# Patient Record
Sex: MALE | Race: WHITE | NOT HISPANIC OR LATINO | Employment: OTHER | URBAN - METROPOLITAN AREA
[De-identification: names, ages, dates, MRNs, and addresses within clinical notes are randomized per-mention and may not be internally consistent; named-entity substitution may affect disease eponyms.]

---

## 2020-06-15 ENCOUNTER — TRANSCRIBE ORDERS (OUTPATIENT)
Dept: PHYSICAL THERAPY | Facility: MEDICAL CENTER | Age: 80
End: 2020-06-15

## 2020-06-15 ENCOUNTER — EVALUATION (OUTPATIENT)
Dept: PHYSICAL THERAPY | Facility: MEDICAL CENTER | Age: 80
End: 2020-06-15
Payer: MEDICARE

## 2020-06-15 DIAGNOSIS — M17.11 PRIMARY OSTEOARTHRITIS OF RIGHT KNEE: Primary | ICD-10-CM

## 2020-06-15 PROCEDURE — 97110 THERAPEUTIC EXERCISES: CPT | Performed by: PHYSICAL THERAPIST

## 2020-06-15 PROCEDURE — 97161 PT EVAL LOW COMPLEX 20 MIN: CPT | Performed by: PHYSICAL THERAPIST

## 2020-06-19 ENCOUNTER — OFFICE VISIT (OUTPATIENT)
Dept: PHYSICAL THERAPY | Facility: MEDICAL CENTER | Age: 80
End: 2020-06-19
Payer: MEDICARE

## 2020-06-19 DIAGNOSIS — M17.11 PRIMARY OSTEOARTHRITIS OF RIGHT KNEE: Primary | ICD-10-CM

## 2020-06-19 PROCEDURE — 97110 THERAPEUTIC EXERCISES: CPT | Performed by: PHYSICAL THERAPIST

## 2020-06-19 PROCEDURE — 97140 MANUAL THERAPY 1/> REGIONS: CPT | Performed by: PHYSICAL THERAPIST

## 2020-06-22 ENCOUNTER — OFFICE VISIT (OUTPATIENT)
Dept: PHYSICAL THERAPY | Facility: MEDICAL CENTER | Age: 80
End: 2020-06-22
Payer: MEDICARE

## 2020-06-22 DIAGNOSIS — M17.11 PRIMARY OSTEOARTHRITIS OF RIGHT KNEE: Primary | ICD-10-CM

## 2020-06-22 PROCEDURE — 97140 MANUAL THERAPY 1/> REGIONS: CPT | Performed by: PHYSICAL THERAPIST

## 2020-06-22 PROCEDURE — 97110 THERAPEUTIC EXERCISES: CPT | Performed by: PHYSICAL THERAPIST

## 2020-06-24 ENCOUNTER — OFFICE VISIT (OUTPATIENT)
Dept: PHYSICAL THERAPY | Facility: MEDICAL CENTER | Age: 80
End: 2020-06-24
Payer: MEDICARE

## 2020-06-24 DIAGNOSIS — M17.11 PRIMARY OSTEOARTHRITIS OF RIGHT KNEE: Primary | ICD-10-CM

## 2020-06-24 PROCEDURE — 97110 THERAPEUTIC EXERCISES: CPT | Performed by: PHYSICAL THERAPIST

## 2020-06-24 PROCEDURE — 97140 MANUAL THERAPY 1/> REGIONS: CPT | Performed by: PHYSICAL THERAPIST

## 2020-07-01 ENCOUNTER — OFFICE VISIT (OUTPATIENT)
Dept: PHYSICAL THERAPY | Facility: MEDICAL CENTER | Age: 80
End: 2020-07-01
Payer: MEDICARE

## 2020-07-01 DIAGNOSIS — M17.11 PRIMARY OSTEOARTHRITIS OF RIGHT KNEE: Primary | ICD-10-CM

## 2020-07-01 PROCEDURE — 97110 THERAPEUTIC EXERCISES: CPT | Performed by: PHYSICAL THERAPIST

## 2020-07-01 PROCEDURE — 97140 MANUAL THERAPY 1/> REGIONS: CPT | Performed by: PHYSICAL THERAPIST

## 2020-07-01 NOTE — PROGRESS NOTES
Daily Note     Today's date: 2020  Patient name: Sean Hall  : 1940  MRN: [id number]  Referring provider: Nader Lin DO  Dx:   Encounter Diagnosis     ICD-10-CM    1  Primary osteoarthritis of right knee M17 11                   Subjective: Mario reports his knee is feeling better, he has been having some pain in the outside of his knee      Objective: See treatment diary below      Assessment: Tolerated treatment well  Patient demonstrated fatigue post treatment and would benefit from continued PT      Plan: Progress treatment as tolerated         Precautions: CHF, ABBEY AIN [facility]      Manuals 6/15 6/19 6/22 6/24 7/1        laser  JONE BECERRIL JE        Tib-fem distraction JONE BECERRIL JE JE JE                                  Neuro Re-Ed                                                                                                        Ther Ex             HS stretch 30"x2 30"x3 30"x3 30"x3 30"x3        gastroc stretch 30"x2 30"x2 30"x2 30"x2 30"x3        Heel slides  15 20 20 30        SLR  20 20 20 30        Stand HS curl  20 20 20 30        Stand hip abd  20 20 20 30        Stand hip ext    20 30                     Ther Activity                                       Gait Training                                       Modalities

## 2020-07-03 ENCOUNTER — OFFICE VISIT (OUTPATIENT)
Dept: PHYSICAL THERAPY | Facility: MEDICAL CENTER | Age: 80
End: 2020-07-03
Payer: MEDICARE

## 2020-07-03 DIAGNOSIS — M17.11 PRIMARY OSTEOARTHRITIS OF RIGHT KNEE: Primary | ICD-10-CM

## 2020-07-03 PROCEDURE — 97110 THERAPEUTIC EXERCISES: CPT | Performed by: PHYSICAL THERAPIST

## 2020-07-03 PROCEDURE — 97140 MANUAL THERAPY 1/> REGIONS: CPT | Performed by: PHYSICAL THERAPIST

## 2020-07-03 NOTE — PROGRESS NOTES
Daily Note     Today's date: 7/3/2020  Patient name: Orion Marks  : 1940  MRN: [id number]  Referring provider: Marcel Stokes DO  Dx:   Encounter Diagnosis     ICD-10-CM    1  Primary osteoarthritis of right knee M17 11                   Subjective: Mario reports his knee has been feeling better      Objective: See treatment diary below      Assessment: Tolerated treatment well  Patient exhibited good technique with therapeutic exercises      Plan: Progress treatment as tolerated         Precautions: CHF, ABBEY AIN [facility]      Manuals 6/15 6/19 6/22 6/24 7/1 7/3       laser  JONE BECERRIL JE JE JE       Tib-fem distraction JONE BECERRIL JE JE JE                                 Neuro Re-Ed                                                                                                        Ther Ex             HS stretch 30"x2 30"x3 30"x3 30"x3 30"x3 30"x3       gastroc stretch 30"x2 30"x2 30"x2 30"x2 30"x3 30"x3       Heel slides  15 20 20 30 30       SLR  20 20 20 30        Stand HS curl  20 20 20 30        Stand hip abd  20 20 20 30 30       Stand hip ext    20 30                     Ther Activity                                       Gait Training                                       Modalities

## 2020-07-08 ENCOUNTER — OFFICE VISIT (OUTPATIENT)
Dept: PHYSICAL THERAPY | Facility: MEDICAL CENTER | Age: 80
End: 2020-07-08
Payer: MEDICARE

## 2020-07-08 DIAGNOSIS — M17.11 PRIMARY OSTEOARTHRITIS OF RIGHT KNEE: Primary | ICD-10-CM

## 2020-07-08 PROCEDURE — 97110 THERAPEUTIC EXERCISES: CPT | Performed by: PHYSICAL THERAPIST

## 2020-07-08 PROCEDURE — 97140 MANUAL THERAPY 1/> REGIONS: CPT | Performed by: PHYSICAL THERAPIST

## 2020-07-08 NOTE — PROGRESS NOTES
Daily Note     Today's date: 2020  Patient name: Moise Peraza  : 1940  MRN: [id number]  Referring provider: Kia Rodriguez DO  Dx:   Encounter Diagnosis     ICD-10-CM    1  Primary osteoarthritis of right knee M17 11                   Subjective: Mario reports his knee continues to feel a lot better  He sees Dr Margarita Flor tomorrow  Objective: See treatment diary below      Assessment: Tolerated treatment well  Patient exhibited good technique with therapeutic exercises      Plan: Potential discharge next visit       Precautions: CHF, ABBEY TWAIN [facility]      Manuals 6/15 6/19 6/22 6/24 7/1 7/3 7/8      laser  JONE BECERRIL JE JE JE JE      Tib-fem distraction JE JONE JE JE JE JE JE                                Neuro Re-Ed                                                                                                        Ther Ex             HS stretch 30"x2 30"x3 30"x3 30"x3 30"x3 30"x3 30"x3      gastroc stretch 30"x2 30"x2 30"x2 30"x2 30"x3 30"x3 30"x3      Heel slides  15 20 20 30 30 30      SLR  20 20 20 30        Stand HS curl  20 20 20 30        Stand hip abd  20 20 20 30 30 30      Stand hip ext    20 30                     Ther Activity                                       Gait Training                                       Modalities

## 2020-07-10 ENCOUNTER — OFFICE VISIT (OUTPATIENT)
Dept: PHYSICAL THERAPY | Facility: MEDICAL CENTER | Age: 80
End: 2020-07-10
Payer: MEDICARE

## 2020-07-10 DIAGNOSIS — M17.11 PRIMARY OSTEOARTHRITIS OF RIGHT KNEE: Primary | ICD-10-CM

## 2020-07-10 PROCEDURE — 97140 MANUAL THERAPY 1/> REGIONS: CPT | Performed by: PHYSICAL THERAPIST

## 2020-07-10 PROCEDURE — 97110 THERAPEUTIC EXERCISES: CPT | Performed by: PHYSICAL THERAPIST

## 2020-07-10 NOTE — PROGRESS NOTES
Daily Note     Today's date: 7/10/2020  Patient name: Winnie Sagastume  : 1940  MRN: [id number]  Referring provider: Fabian Hampton DO  Dx:   Encounter Diagnosis     ICD-10-CM    1  Primary osteoarthritis of right knee M17 11                   Subjective: Mario reports Dr Thelma Altman pleased with progress thus far      Objective: See treatment diary below      Assessment: Tolerated treatment well  Patient would benefit from continued PT      Plan: Progress treatment as tolerated         Precautions: CHF, ABBEY TWAIN [facility]      Manuals 6/15 6/19 6/22 6/24 7/1 7/3 7/8 7/10     laser  JE JE JE JE JE JE JE     Tib-fem distraction JE JE JE JE JE JE JE JE                               Neuro Re-Ed                                                                                                        Ther Ex             HS stretch 30"x2 30"x3 30"x3 30"x3 30"x3 30"x3 30"x3 30"x3     gastroc stretch 30"x2 30"x2 30"x2 30"x2 30"x3 30"x3 30"x3 30"x3     Heel slides  15 20 20 30 30 30 30     SLR  20 20 20 30        Stand HS curl  20 20 20 30        Stand hip abd  20 20 20 30 30 30 30     Stand hip ext    20 30                     Ther Activity                                       Gait Training                                       Modalities

## 2020-07-15 ENCOUNTER — OFFICE VISIT (OUTPATIENT)
Dept: PHYSICAL THERAPY | Facility: MEDICAL CENTER | Age: 80
End: 2020-07-15
Payer: MEDICARE

## 2020-07-15 DIAGNOSIS — M17.11 PRIMARY OSTEOARTHRITIS OF RIGHT KNEE: Primary | ICD-10-CM

## 2020-07-15 PROCEDURE — 97140 MANUAL THERAPY 1/> REGIONS: CPT | Performed by: PHYSICAL THERAPIST

## 2020-07-15 PROCEDURE — 97110 THERAPEUTIC EXERCISES: CPT | Performed by: PHYSICAL THERAPIST

## 2020-07-15 NOTE — PROGRESS NOTES
Daily Note     Today's date: 7/15/2020  Patient name: King Braydon  : 1940  MRN: [id number]  Referring provider: Quynh Alford DO  Dx:   Encounter Diagnosis     ICD-10-CM    1  Primary osteoarthritis of right knee M17 11                   Subjective: Mario reports his knee has been feeling good      Objective: See treatment diary below      Assessment: Tolerated treatment well  Patient exhibited good technique with therapeutic exercises      Plan: Potential discharge next visit       Precautions: CHF, ABBEY TWAIN [facility]      Manuals 6/15 6/19 6/22 6/24 7/1 7/3 7/8 7/10 7/15    laser  JE JE JE JE JE JE JE JE    Tib-fem distraction JE JE JE JE JE JE JE JE JE                              Neuro Re-Ed                                                                                                        Ther Ex             HS stretch 30"x2 30"x3 30"x3 30"x3 30"x3 30"x3 30"x3 30"x3 3    gastroc stretch 30"x2 30"x2 30"x2 30"x2 30"x3 30"x3 30"x3 30"x3 3    Heel slides  15 20 20 30 30 30 30 30    SLR  20 20 20 30        Stand HS curl  20 20 20 30        Stand hip abd  20 20 20 30 30 30 30 30    Stand hip ext    20 30                     Ther Activity                                       Gait Training                                       Modalities

## 2020-07-17 ENCOUNTER — OFFICE VISIT (OUTPATIENT)
Dept: PHYSICAL THERAPY | Facility: MEDICAL CENTER | Age: 80
End: 2020-07-17
Payer: MEDICARE

## 2020-07-17 DIAGNOSIS — M17.11 PRIMARY OSTEOARTHRITIS OF RIGHT KNEE: Primary | ICD-10-CM

## 2020-07-17 PROCEDURE — 97140 MANUAL THERAPY 1/> REGIONS: CPT | Performed by: PHYSICAL THERAPIST

## 2020-07-17 PROCEDURE — 97110 THERAPEUTIC EXERCISES: CPT | Performed by: PHYSICAL THERAPIST

## 2020-07-17 NOTE — PROGRESS NOTES
Daily Note     Today's date: 2020  Patient name: Winnie Sagastume  : 1940  MRN: [id number]  Referring provider: Fabian Hampton DO  Dx:   Encounter Diagnosis     ICD-10-CM    1  Primary osteoarthritis of right knee M17 11                   Subjective: Mario reports his knee feels stiff and achy today      Objective: See treatment diary below      Assessment: Tolerated treatment well  Patient would benefit from continued PT      Plan: Progress treatment as tolerated         Precautions: CHF, ABBEY TWAIN [facility]      Manuals 6/15 6/19 6/22 6/24 7/1 7/3 7/8 7/10 7/15 7/17   laser  JE JE JE JE JE JE JE JE JE   Tib-fem distraction JE JE JE JE JE JE JE JE JE JE                             Neuro Re-Ed                                                                                                        Ther Ex             HS stretch 30"x2 30"x3 30"x3 30"x3 30"x3 30"x3 30"x3 30"x3 3 3   gastroc stretch 30"x2 30"x2 30"x2 30"x2 30"x3 30"x3 30"x3 30"x3 3 3   Heel slides  15 20 20 30 30 30 30 30 30   SLR  20 20 20 30        Stand HS curl  20 20 20 30        Stand hip abd  20 20 20 30 30 30 30 30 30   Stand hip ext    20 30                     Ther Activity                                       Gait Training                                       Modalities

## 2020-07-22 ENCOUNTER — OFFICE VISIT (OUTPATIENT)
Dept: PHYSICAL THERAPY | Facility: MEDICAL CENTER | Age: 80
End: 2020-07-22
Payer: MEDICARE

## 2020-07-22 DIAGNOSIS — M17.11 PRIMARY OSTEOARTHRITIS OF RIGHT KNEE: Primary | ICD-10-CM

## 2020-07-22 PROCEDURE — 97140 MANUAL THERAPY 1/> REGIONS: CPT | Performed by: PHYSICAL THERAPIST

## 2020-07-22 PROCEDURE — 97110 THERAPEUTIC EXERCISES: CPT | Performed by: PHYSICAL THERAPIST

## 2020-07-22 NOTE — PROGRESS NOTES
Daily Note     Today's date: 2020  Patient name: Zachariah Damian  : 1940  MRN: [id number]  Referring provider: Raúl Dillon DO  Dx:   Encounter Diagnosis     ICD-10-CM    1  Primary osteoarthritis of right knee M17 11                   Subjective: Mario reports his knee feels achy      Objective: See treatment diary below      Assessment: Tolerated treatment well  Patient exhibited good technique with therapeutic exercises      Plan: Potential discharge next visit       Precautions: CHF, ABBEY TWAIN [facility]      Manuals 7/22 6/19 6/22 6/24 7/1 7/3 7/8 7/10 7/15 7/17   laser JE JE JE JE JE JE JE JE JE JE   Tib-fem distraction JE JE JE JE JE JE JE JE JE JE                             Neuro Re-Ed                                                                                                        Ther Ex             HS stretch 30"x3 30"x3 30"x3 30"x3 30"x3 30"x3 30"x3 30"x3 3 3   gastroc stretch 30"x3 30"x2 30"x2 30"x2 30"x3 30"x3 30"x3 30"x3 3 3   Heel slides  15 20 20 30 30 30 30 30 30   SLR  20 20 20 30        Stand HS curl  20 20 20 30        Stand hip abd 30 20 20 20 30 30 30 30 30 30   Stand hip ext    20 30                     Ther Activity                                       Gait Training                                       Modalities

## 2020-07-24 ENCOUNTER — OFFICE VISIT (OUTPATIENT)
Dept: PHYSICAL THERAPY | Facility: MEDICAL CENTER | Age: 80
End: 2020-07-24
Payer: MEDICARE

## 2020-07-24 DIAGNOSIS — M17.11 PRIMARY OSTEOARTHRITIS OF RIGHT KNEE: Primary | ICD-10-CM

## 2020-07-24 PROCEDURE — 97140 MANUAL THERAPY 1/> REGIONS: CPT | Performed by: PHYSICAL THERAPIST

## 2020-07-24 PROCEDURE — 97110 THERAPEUTIC EXERCISES: CPT | Performed by: PHYSICAL THERAPIST

## 2020-07-24 NOTE — PROGRESS NOTES
Daily Note     Today's date: 2020  Patient name: Brenda Joy  : 1940  MRN: [id number]  Referring provider: Shilpa Torres DO  Dx:   Encounter Diagnosis     ICD-10-CM    1  Primary osteoarthritis of right knee M17 11                   Subjective: Mario reports his knee is achy  He still does not have the  brace that was ordered      Objective: See treatment diary below      Assessment: Tolerated treatment well  Patient exhibited good technique with therapeutic exercises      Plan: Progress treatment as tolerated         Precautions: CHF, ABBEY TWAIN [facility]      Manuals 7/22 7/24 6/22 6/24 7/1 7/3 7/8 7/10 7/15 7/17   laser JE JE JE JE JE JE JE JE JE JE   Tib-fem distraction JE JE JE JE JE JE JE JE JE JE                             Neuro Re-Ed                                                                                                        Ther Ex             HS stretch 30"x3 30"x3 30"x3 30"x3 30"x3 30"x3 30"x3 30"x3 3 3   gastroc stretch 30"x3 30"x3 30"x2 30"x2 30"x3 30"x3 30"x3 30"x3 3 3   Heel slides  30 20 20 30 30 30 30 30 30   SLR   20 20 30        Stand HS curl   20 20 30        Stand hip abd 30 30 20 20 30 30 30 30 30 30   Stand hip ext    20 30                     Ther Activity                                       Gait Training                                       Modalities

## 2020-07-27 ENCOUNTER — OFFICE VISIT (OUTPATIENT)
Dept: PHYSICAL THERAPY | Facility: MEDICAL CENTER | Age: 80
End: 2020-07-27
Payer: MEDICARE

## 2020-07-27 DIAGNOSIS — M17.11 PRIMARY OSTEOARTHRITIS OF RIGHT KNEE: Primary | ICD-10-CM

## 2020-07-27 PROCEDURE — 97140 MANUAL THERAPY 1/> REGIONS: CPT | Performed by: PHYSICAL THERAPIST

## 2020-07-27 PROCEDURE — 97110 THERAPEUTIC EXERCISES: CPT | Performed by: PHYSICAL THERAPIST

## 2020-07-27 NOTE — PROGRESS NOTES
Daily Note     Today's date: 2020  Patient name: Marge Rizvi  : 1940  MRN: [id number]  Referring provider: Yg Willard DO  Dx:   Encounter Diagnosis     ICD-10-CM    1  Primary osteoarthritis of right knee M17 11                   Subjective: Mario reports he still hasn't heard anything about his  brace so he is going to call today      Objective: See treatment diary below      Assessment: Tolerated treatment well  Patient exhibited good technique with therapeutic exercises      Plan: Progress treatment as tolerated         Precautions: CHF, ABBEY FAISAL [facility]      Manuals 7/22 7/24 7/27 6/24 7/1 7/3 7/8 7/10 7/15 7/17   laser JE JE JE JE JE JE JE JE JE JE   Tib-fem distraction JE JE JE JE JE JE JE JE JE JE                             Neuro Re-Ed                                                                                                        Ther Ex             HS stretch 30"x3 30"x3 30"x3 30"x3 30"x3 30"x3 30"x3 30"x3 3 3   gastroc stretch 30"x3 30"x3 30"x2 30"x2 30"x3 30"x3 30"x3 30"x3 3 3   Heel slides  30 30 20 30 30 30 30 30 30   SLR    20 30        Stand HS curl    20 30        Stand hip abd 30 30 30 20 30 30 30 30 30 30   Stand hip ext    20 30                     Ther Activity                                       Gait Training                                       Modalities

## 2020-07-29 ENCOUNTER — OFFICE VISIT (OUTPATIENT)
Dept: PHYSICAL THERAPY | Facility: MEDICAL CENTER | Age: 80
End: 2020-07-29
Payer: MEDICARE

## 2020-07-29 DIAGNOSIS — M17.11 PRIMARY OSTEOARTHRITIS OF RIGHT KNEE: Primary | ICD-10-CM

## 2020-07-29 PROCEDURE — 97140 MANUAL THERAPY 1/> REGIONS: CPT | Performed by: PHYSICAL THERAPIST

## 2020-07-29 PROCEDURE — 97110 THERAPEUTIC EXERCISES: CPT | Performed by: PHYSICAL THERAPIST

## 2020-07-29 NOTE — PROGRESS NOTES
Daily Note     Today's date: 2020  Patient name: Mickey Khan  : 1940  MRN: [id number]  Referring provider: Freddy Roe DO  Dx:   Encounter Diagnosis     ICD-10-CM    1  Primary osteoarthritis of right knee M17 11                   Subjective: Mario reports he feels ok today, still experiences stiffness and aching      Objective: See treatment diary below      Assessment: Tolerated treatment well  Patient exhibited good technique with therapeutic exercises      Plan: Progress treatment as tolerated         Precautions: CHF, ABBEY TWAIN [facility]      Manuals 7/22 7/29 7/27 7/29 7/1 7/3 7/8 7/10 7/15 7/17   laser JE JE JE JE JE JE JE JE JE JE   Tib-fem distraction JE JE JE JE JE JE JE JE JE JE                             Neuro Re-Ed                                                                                                        Ther Ex             HS stretch 30"x3 30"x3 30"x3 30"x3 30"x3 30"x3 30"x3 30"x3 3 3   gastroc stretch 30"x3 30"x3 30"x2 30"x2 30"x3 30"x3 30"x3 30"x3 3 3   Heel slides  30 30 20 30 30 30 30 30 30   SLR    20 30        Stand HS curl    20 30        Stand hip abd 30 30 30 20 30 30 30 30 30 30   Stand hip ext    20 30                     Ther Activity                                       Gait Training                                       Modalities

## 2020-08-03 ENCOUNTER — OFFICE VISIT (OUTPATIENT)
Dept: PHYSICAL THERAPY | Facility: MEDICAL CENTER | Age: 80
End: 2020-08-03
Payer: MEDICARE

## 2020-08-03 DIAGNOSIS — M17.11 PRIMARY OSTEOARTHRITIS OF RIGHT KNEE: Primary | ICD-10-CM

## 2020-08-03 PROCEDURE — 97110 THERAPEUTIC EXERCISES: CPT | Performed by: PHYSICAL THERAPIST

## 2020-08-03 PROCEDURE — 97140 MANUAL THERAPY 1/> REGIONS: CPT | Performed by: PHYSICAL THERAPIST

## 2020-08-03 NOTE — PROGRESS NOTES
Daily Note     Today's date: 8/3/2020  Patient name: Audie Garrido  : 1940  MRN: [id number]  Referring provider: Marcia Butcher DO  Dx:   Encounter Diagnosis     ICD-10-CM    1  Primary osteoarthritis of right knee  M17 11                   Subjective: Mario reports he finally received his  brace       Objective: See treatment diary below      Assessment: Tolerated treatment well  Patient exhibited good technique with therapeutic exercises      Plan: Potential discharge next visit       Precautions: CHF, ABBEY TWAIN [facility]      Manuals 7/22 7/29 7/27 8/3 7/1 7/3 7/8 7/10 7/15 7/17   laser JE JE JE JE JE JE JE JE JE JE   Tib-fem distraction JE JE JE JE JE JE JE JE JE JE                             Neuro Re-Ed                                                                                                        Ther Ex             HS stretch 30"x3 30"x3 30"x3 30"x3 30"x3 30"x3 30"x3 30"x3 3 3   gastroc stretch 30"x3 30"x3 30"x2 30"x3 30"x3 30"x3 30"x3 30"x3 3 3   Heel slides  30 30 30 30 30 30 30 30 30   SLR     30        Stand HS curl     30        Stand hip abd 30 30 30 20 30 30 30 30 30 30   Stand hip ext     30                     Ther Activity                                       Gait Training                                       Modalities

## 2020-08-05 ENCOUNTER — APPOINTMENT (OUTPATIENT)
Dept: PHYSICAL THERAPY | Facility: MEDICAL CENTER | Age: 80
End: 2020-08-05
Payer: MEDICARE

## 2020-08-07 ENCOUNTER — OFFICE VISIT (OUTPATIENT)
Dept: PHYSICAL THERAPY | Facility: MEDICAL CENTER | Age: 80
End: 2020-08-07
Payer: MEDICARE

## 2020-08-07 DIAGNOSIS — M17.11 PRIMARY OSTEOARTHRITIS OF RIGHT KNEE: Primary | ICD-10-CM

## 2020-08-07 PROCEDURE — 97140 MANUAL THERAPY 1/> REGIONS: CPT | Performed by: PHYSICAL THERAPIST

## 2020-08-07 PROCEDURE — 97110 THERAPEUTIC EXERCISES: CPT | Performed by: PHYSICAL THERAPIST

## 2020-08-07 NOTE — PROGRESS NOTES
Daily Note     Today's date: 2020  Patient name: Kristina Carlos  : 1940  MRN: [id number]  Referring provider: Luca Luong DO  Dx:   Encounter Diagnosis     ICD-10-CM    1  Primary osteoarthritis of right knee  M17 11                   Subjective: Mario reports his knee has been feeling good      Objective: See treatment diary below      Assessment: Tolerated treatment well   Patient exhibited good technique with therapeutic exercises      Plan: DC to HEP     Precautions: CHF, ABBEY AIN [facility]      Manuals 7/22 7/29 7/27 8/3 8/7 7/3 7/8 7/10 7/15 7/17   laser JE JE JE JE JE JE JE JE JE JE   Tib-fem distraction JE JE JE JE JE JE JE JE JE JE                             Neuro Re-Ed                                                                                                        Ther Ex             HS stretch 30"x3 30"x3 30"x3 30"x3 30"x3 30"x3 30"x3 30"x3 3 3   gastroc stretch 30"x3 30"x3 30"x2 30"x3 30"x3 30"x3 30"x3 30"x3 3 3   Heel slides  30 30 30 30 30 30 30 30 30   SLR             Stand HS curl             Stand hip abd 30 30 30 20 30 30 30 30 30 30   Stand hip ext                          Ther Activity                                       Gait Training                                       Modalities

## 2020-10-05 ENCOUNTER — EVALUATION (OUTPATIENT)
Dept: PHYSICAL THERAPY | Facility: MEDICAL CENTER | Age: 80
End: 2020-10-05
Payer: MEDICARE

## 2020-10-05 ENCOUNTER — TRANSCRIBE ORDERS (OUTPATIENT)
Dept: PHYSICAL THERAPY | Facility: MEDICAL CENTER | Age: 80
End: 2020-10-05

## 2020-10-05 DIAGNOSIS — M76.31 ILIOTIBIAL BAND SYNDROME OF RIGHT SIDE: Primary | ICD-10-CM

## 2020-10-05 DIAGNOSIS — M17.11 PRIMARY OSTEOARTHRITIS OF RIGHT KNEE: ICD-10-CM

## 2020-10-05 PROCEDURE — 97161 PT EVAL LOW COMPLEX 20 MIN: CPT | Performed by: PHYSICAL THERAPIST

## 2020-10-05 PROCEDURE — 97140 MANUAL THERAPY 1/> REGIONS: CPT | Performed by: PHYSICAL THERAPIST

## 2020-10-09 ENCOUNTER — OFFICE VISIT (OUTPATIENT)
Dept: PHYSICAL THERAPY | Facility: MEDICAL CENTER | Age: 80
End: 2020-10-09
Payer: MEDICARE

## 2020-10-09 DIAGNOSIS — M76.31 ILIOTIBIAL BAND SYNDROME OF RIGHT SIDE: Primary | ICD-10-CM

## 2020-10-09 DIAGNOSIS — M17.11 PRIMARY OSTEOARTHRITIS OF RIGHT KNEE: ICD-10-CM

## 2020-10-09 PROCEDURE — 97140 MANUAL THERAPY 1/> REGIONS: CPT | Performed by: PHYSICAL THERAPIST

## 2020-10-12 ENCOUNTER — OFFICE VISIT (OUTPATIENT)
Dept: PHYSICAL THERAPY | Facility: MEDICAL CENTER | Age: 80
End: 2020-10-12
Payer: MEDICARE

## 2020-10-12 DIAGNOSIS — M17.11 PRIMARY OSTEOARTHRITIS OF RIGHT KNEE: Primary | ICD-10-CM

## 2020-10-12 DIAGNOSIS — M76.31 ILIOTIBIAL BAND SYNDROME OF RIGHT SIDE: ICD-10-CM

## 2020-10-12 PROCEDURE — 97140 MANUAL THERAPY 1/> REGIONS: CPT | Performed by: PHYSICAL THERAPIST

## 2020-10-14 ENCOUNTER — OFFICE VISIT (OUTPATIENT)
Dept: PHYSICAL THERAPY | Facility: MEDICAL CENTER | Age: 80
End: 2020-10-14
Payer: MEDICARE

## 2020-10-14 DIAGNOSIS — M17.11 PRIMARY OSTEOARTHRITIS OF RIGHT KNEE: Primary | ICD-10-CM

## 2020-10-14 DIAGNOSIS — M76.31 ILIOTIBIAL BAND SYNDROME OF RIGHT SIDE: ICD-10-CM

## 2020-10-14 PROCEDURE — 97140 MANUAL THERAPY 1/> REGIONS: CPT | Performed by: PHYSICAL THERAPIST

## 2020-10-14 PROCEDURE — 97110 THERAPEUTIC EXERCISES: CPT | Performed by: PHYSICAL THERAPIST

## 2020-10-19 ENCOUNTER — OFFICE VISIT (OUTPATIENT)
Dept: PHYSICAL THERAPY | Facility: MEDICAL CENTER | Age: 80
End: 2020-10-19
Payer: MEDICARE

## 2020-10-19 DIAGNOSIS — M17.11 PRIMARY OSTEOARTHRITIS OF RIGHT KNEE: Primary | ICD-10-CM

## 2020-10-19 DIAGNOSIS — M76.31 ILIOTIBIAL BAND SYNDROME OF RIGHT SIDE: ICD-10-CM

## 2020-10-19 PROCEDURE — 97140 MANUAL THERAPY 1/> REGIONS: CPT | Performed by: PHYSICAL THERAPIST

## 2020-10-21 ENCOUNTER — OFFICE VISIT (OUTPATIENT)
Dept: PHYSICAL THERAPY | Facility: MEDICAL CENTER | Age: 80
End: 2020-10-21
Payer: MEDICARE

## 2020-10-21 DIAGNOSIS — M17.11 PRIMARY OSTEOARTHRITIS OF RIGHT KNEE: Primary | ICD-10-CM

## 2020-10-21 DIAGNOSIS — M76.31 ILIOTIBIAL BAND SYNDROME OF RIGHT SIDE: ICD-10-CM

## 2020-10-21 PROCEDURE — 97140 MANUAL THERAPY 1/> REGIONS: CPT | Performed by: PHYSICAL THERAPIST

## 2020-10-26 ENCOUNTER — OFFICE VISIT (OUTPATIENT)
Dept: PHYSICAL THERAPY | Facility: MEDICAL CENTER | Age: 80
End: 2020-10-26
Payer: MEDICARE

## 2020-10-26 DIAGNOSIS — M17.11 PRIMARY OSTEOARTHRITIS OF RIGHT KNEE: Primary | ICD-10-CM

## 2020-10-26 DIAGNOSIS — M76.31 ILIOTIBIAL BAND SYNDROME OF RIGHT SIDE: ICD-10-CM

## 2020-10-26 PROCEDURE — 97140 MANUAL THERAPY 1/> REGIONS: CPT | Performed by: PHYSICAL THERAPIST

## 2020-10-26 PROCEDURE — 97110 THERAPEUTIC EXERCISES: CPT | Performed by: PHYSICAL THERAPIST

## 2020-10-28 ENCOUNTER — OFFICE VISIT (OUTPATIENT)
Dept: PHYSICAL THERAPY | Facility: MEDICAL CENTER | Age: 80
End: 2020-10-28
Payer: MEDICARE

## 2020-10-30 ENCOUNTER — APPOINTMENT (OUTPATIENT)
Dept: PHYSICAL THERAPY | Facility: MEDICAL CENTER | Age: 80
End: 2020-10-30
Payer: MEDICARE

## 2020-11-02 ENCOUNTER — OFFICE VISIT (OUTPATIENT)
Dept: PHYSICAL THERAPY | Facility: MEDICAL CENTER | Age: 80
End: 2020-11-02
Payer: MEDICARE

## 2020-11-02 DIAGNOSIS — M76.31 ILIOTIBIAL BAND SYNDROME OF RIGHT SIDE: ICD-10-CM

## 2020-11-02 DIAGNOSIS — M17.11 PRIMARY OSTEOARTHRITIS OF RIGHT KNEE: Primary | ICD-10-CM

## 2020-11-02 PROCEDURE — 97140 MANUAL THERAPY 1/> REGIONS: CPT | Performed by: PHYSICAL THERAPIST

## 2020-11-02 PROCEDURE — 97110 THERAPEUTIC EXERCISES: CPT | Performed by: PHYSICAL THERAPIST

## 2020-11-06 ENCOUNTER — OFFICE VISIT (OUTPATIENT)
Dept: PHYSICAL THERAPY | Facility: MEDICAL CENTER | Age: 80
End: 2020-11-06
Payer: MEDICARE

## 2020-11-06 DIAGNOSIS — M76.31 ILIOTIBIAL BAND SYNDROME OF RIGHT SIDE: ICD-10-CM

## 2020-11-06 DIAGNOSIS — M17.11 PRIMARY OSTEOARTHRITIS OF RIGHT KNEE: Primary | ICD-10-CM

## 2020-11-06 PROCEDURE — 97140 MANUAL THERAPY 1/> REGIONS: CPT | Performed by: PHYSICAL THERAPIST

## 2020-11-11 ENCOUNTER — OFFICE VISIT (OUTPATIENT)
Dept: PHYSICAL THERAPY | Facility: MEDICAL CENTER | Age: 80
End: 2020-11-11
Payer: MEDICARE

## 2020-11-11 DIAGNOSIS — M76.31 ILIOTIBIAL BAND SYNDROME OF RIGHT SIDE: ICD-10-CM

## 2020-11-11 DIAGNOSIS — M17.11 PRIMARY OSTEOARTHRITIS OF RIGHT KNEE: Primary | ICD-10-CM

## 2020-11-11 PROCEDURE — 97110 THERAPEUTIC EXERCISES: CPT | Performed by: PHYSICAL THERAPIST

## 2020-11-11 PROCEDURE — 97140 MANUAL THERAPY 1/> REGIONS: CPT | Performed by: PHYSICAL THERAPIST

## 2020-11-13 ENCOUNTER — OFFICE VISIT (OUTPATIENT)
Dept: PHYSICAL THERAPY | Facility: MEDICAL CENTER | Age: 80
End: 2020-11-13
Payer: MEDICARE

## 2020-11-13 DIAGNOSIS — M76.31 ILIOTIBIAL BAND SYNDROME OF RIGHT SIDE: ICD-10-CM

## 2020-11-13 DIAGNOSIS — M17.11 PRIMARY OSTEOARTHRITIS OF RIGHT KNEE: Primary | ICD-10-CM

## 2020-11-13 PROCEDURE — 97140 MANUAL THERAPY 1/> REGIONS: CPT | Performed by: PHYSICAL THERAPIST

## 2020-11-16 ENCOUNTER — OFFICE VISIT (OUTPATIENT)
Dept: PHYSICAL THERAPY | Facility: MEDICAL CENTER | Age: 80
End: 2020-11-16
Payer: MEDICARE

## 2020-11-16 DIAGNOSIS — M17.11 PRIMARY OSTEOARTHRITIS OF RIGHT KNEE: Primary | ICD-10-CM

## 2020-11-16 DIAGNOSIS — M76.31 ILIOTIBIAL BAND SYNDROME OF RIGHT SIDE: ICD-10-CM

## 2020-11-16 PROCEDURE — 97140 MANUAL THERAPY 1/> REGIONS: CPT | Performed by: PHYSICAL THERAPIST

## 2020-11-16 PROCEDURE — 97110 THERAPEUTIC EXERCISES: CPT | Performed by: PHYSICAL THERAPIST

## 2020-11-20 ENCOUNTER — OFFICE VISIT (OUTPATIENT)
Dept: PHYSICAL THERAPY | Facility: MEDICAL CENTER | Age: 80
End: 2020-11-20
Payer: MEDICARE

## 2020-11-20 DIAGNOSIS — M17.11 PRIMARY OSTEOARTHRITIS OF RIGHT KNEE: Primary | ICD-10-CM

## 2020-11-20 DIAGNOSIS — M76.31 ILIOTIBIAL BAND SYNDROME OF RIGHT SIDE: ICD-10-CM

## 2020-11-20 PROCEDURE — 97140 MANUAL THERAPY 1/> REGIONS: CPT | Performed by: PHYSICAL THERAPIST

## 2020-11-27 ENCOUNTER — OFFICE VISIT (OUTPATIENT)
Dept: PHYSICAL THERAPY | Facility: MEDICAL CENTER | Age: 80
End: 2020-11-27
Payer: MEDICARE

## 2020-11-27 DIAGNOSIS — M17.11 PRIMARY OSTEOARTHRITIS OF RIGHT KNEE: Primary | ICD-10-CM

## 2020-11-27 DIAGNOSIS — M76.31 ILIOTIBIAL BAND SYNDROME OF RIGHT SIDE: ICD-10-CM

## 2020-11-27 PROCEDURE — 97140 MANUAL THERAPY 1/> REGIONS: CPT | Performed by: PHYSICAL THERAPIST

## 2020-12-04 ENCOUNTER — OFFICE VISIT (OUTPATIENT)
Dept: PHYSICAL THERAPY | Facility: MEDICAL CENTER | Age: 80
End: 2020-12-04
Payer: MEDICARE

## 2020-12-04 DIAGNOSIS — M17.11 PRIMARY OSTEOARTHRITIS OF RIGHT KNEE: Primary | ICD-10-CM

## 2020-12-04 DIAGNOSIS — M76.31 ILIOTIBIAL BAND SYNDROME OF RIGHT SIDE: ICD-10-CM

## 2020-12-04 PROCEDURE — 97140 MANUAL THERAPY 1/> REGIONS: CPT | Performed by: PHYSICAL THERAPIST

## 2021-03-24 ENCOUNTER — EVALUATION (OUTPATIENT)
Dept: PHYSICAL THERAPY | Facility: MEDICAL CENTER | Age: 81
End: 2021-03-24
Payer: MEDICARE

## 2021-03-24 ENCOUNTER — TRANSCRIBE ORDERS (OUTPATIENT)
Dept: PHYSICAL THERAPY | Facility: MEDICAL CENTER | Age: 81
End: 2021-03-24

## 2021-03-24 DIAGNOSIS — Z96.651 S/P TKR (TOTAL KNEE REPLACEMENT), RIGHT: Primary | ICD-10-CM

## 2021-03-24 PROCEDURE — 97110 THERAPEUTIC EXERCISES: CPT | Performed by: PHYSICAL THERAPIST

## 2021-03-24 PROCEDURE — 97161 PT EVAL LOW COMPLEX 20 MIN: CPT | Performed by: PHYSICAL THERAPIST

## 2021-03-24 PROCEDURE — 97140 MANUAL THERAPY 1/> REGIONS: CPT | Performed by: PHYSICAL THERAPIST

## 2021-03-24 NOTE — PROGRESS NOTES
PT Evaluation     Today's date: 3/24/2021  Patient name: Darnell Thompson  : 1940  MRN: [id number]  Referring provider: Jackelin Petersen MD  Dx:   Encounter Diagnosis     ICD-10-CM    1  S/P TKR (total knee replacement), right  Z96 [id number]                   Assessment  Assessment details: Darnell Thompson is a 68 y o  male presents S/P TKR, right on 3/19/21  Darnell Thompson has the below listed impairments and will benefit from skilled PT to improve deficits to return to prior level of function  Impairments: abnormal muscle firing, abnormal or restricted ROM, impaired physical strength and pain with function  Understanding of Dx/Px/POC: good   Prognosis: good    Goals  Impairment Goals  - Decrease pain to 0-3/10  - Improve ROM equal to contralateral side  - Increase strength equal to contralateral side    Functional Goals  - Patient will be independent with comprehensive HEP  - Ambulation is improved to prior level of function  - Stair climbing is improved to prior level of function  - Squatting is improved to prior level of function      Plan  Patient would benefit from: skilled PT  Referral necessary: No  Planned therapy interventions: home exercise program, manual therapy, neuromuscular re-education, patient education, functional ROM exercises, strengthening, stretching and joint mobilization  Frequency: 2x week  Duration in weeks: 12  Treatment plan discussed with: patient        Subjective Evaluation    History of Present Illness  Mechanism of injury: Mario is s/p R TKR on 3/19/21  Patient reports no complications during surgery  He was discharged to home on 3/20  Currently ambulating with wheeled walker     Pain  At worst pain ratin    Patient Goals  Patient goals for therapy: decreased pain, increased motion and increased strength          Objective     Active Range of Motion     Right Knee   Flexion: 102 degrees   Extension: -3 degrees     Mobility   Patellar Mobility:     Right Knee   WFL: medial and lateral  Hypomobile: superior and inferior     Strength/Myotome Testing     Right Hip   Planes of Motion   Flexion: 3  Extension: 3  Abduction: 3  Adduction: 3    Right Knee   Quadriceps contraction: poor    Additional Strength Details  Unable to do SLR             Precautions: lumbar fusion      Manuals 3/24            R patellar mobs JE            R knee PROM JE                                      Neuro Re-Ed                                                                                                        Ther Ex             Quad sets 5" x20            SLR 2x10            Ankle pumps 30            Supine hip abd  20            Heel slides 20            gastroc stretch 30"x3            HS stretch 30"x2            SAQ 2x10            LAQ 2x10                                      Ther Activity                                       Gait Training                                       Modalities

## 2021-03-24 NOTE — LETTER
416    Herminia Wyatt MD  [facility] [address] [place] [address]    Patient: Angely Lazo   YOB: 1940   Date of Visit: 3/24/2021     Encounter Diagnosis     ICD-10-CM    1  S/P TKR (total knee replacement), right  [id number] [id number]        Dear Dr Mojgan Dsouza: Thank you for your recent referral of Mario Tidwell  Please review the attached evaluation summary from Mario's recent visit  Please verify that you agree with the plan of care by signing the attached order  If you have any questions or concerns, please do not hesitate to call  I sincerely appreciate the opportunity to share in the care of one of your patients and hope to have another opportunity to work with you in the near future  Sincerely,    Jocy Quiles, PT      Referring Provider:      I certify that I have read the below Plan of Care and certify the need for these services furnished under this plan of treatment while under my care  Herminia Wyatt MD  [facility]  Via Fax: [phone]          PT Evaluation     Today's date: 3/24/2021  Patient name: Angely Lazo  : 1940  MRN: [id number]  Referring provider: Natalie Luz MD  Dx:   Encounter Diagnosis     ICD-10-CM    1  S/P TKR (total knee replacement), right  [id number] [id number]                   Assessment  Assessment details: Angely Lazo is a 68 y o  male presents S/P TKR, right on 3/19/21  Angely Lazo has the below listed impairments and will benefit from skilled PT to improve deficits to return to prior level of function     Impairments: abnormal muscle firing, abnormal or restricted ROM, impaired physical strength and pain with function  Understanding of Dx/Px/POC: good   Prognosis: good    Goals  Impairment Goals  - Decrease pain to 0-3/10  - Improve ROM equal to contralateral side  - Increase strength equal to contralateral side    Functional Goals  - Patient will be independent with comprehensive HEP  - Ambulation is improved to prior level of function  - Stair climbing is improved to prior level of function  - Squatting is improved to prior level of function      Plan  Patient would benefit from: skilled PT  Referral necessary: No  Planned therapy interventions: home exercise program, manual therapy, neuromuscular re-education, patient education, functional ROM exercises, strengthening, stretching and joint mobilization  Frequency: 2x week  Duration in weeks: 12  Treatment plan discussed with: patient        Subjective Evaluation    History of Present Illness  Mechanism of injury: Mario is s/p R TKR on 3/19/21  Patient reports no complications during surgery  He was discharged to home on 3/20  Currently ambulating with wheeled walker     Pain  At worst pain ratin    Patient Goals  Patient goals for therapy: decreased pain, increased motion and increased strength          Objective     Active Range of Motion     Right Knee   Flexion: 102 degrees   Extension: -3 degrees     Mobility   Patellar Mobility:     Right Knee   WFL: medial and lateral  Hypomobile: superior and inferior     Strength/Myotome Testing     Right Hip   Planes of Motion   Flexion: 3  Extension: 3  Abduction: 3  Adduction: 3    Right Knee   Quadriceps contraction: poor    Additional Strength Details  Unable to do SLR             Precautions: lumbar fusion      Manuals 3/24            R patellar mobs JE            R knee PROM JE                                      Neuro Re-Ed                                                                                                        Ther Ex             Quad sets 5" x20            SLR 2x10            Ankle pumps 30            Supine hip abd  20            Heel slides 20            gastroc stretch 30"x3            HS stretch 30"x2            SAQ 2x10            LAQ 2x10                                      Ther Activity                                       Gait Training Modalities

## 2021-03-26 ENCOUNTER — OFFICE VISIT (OUTPATIENT)
Dept: PHYSICAL THERAPY | Facility: MEDICAL CENTER | Age: 81
End: 2021-03-26
Payer: MEDICARE

## 2021-03-26 DIAGNOSIS — Z96.651 S/P TKR (TOTAL KNEE REPLACEMENT), RIGHT: Primary | ICD-10-CM

## 2021-03-26 PROCEDURE — 97110 THERAPEUTIC EXERCISES: CPT | Performed by: PHYSICAL THERAPIST

## 2021-03-26 PROCEDURE — 97140 MANUAL THERAPY 1/> REGIONS: CPT | Performed by: PHYSICAL THERAPIST

## 2021-03-26 NOTE — PROGRESS NOTES
Daily Note     Today's date: 3/26/2021  Patient name: Lior Pop  : 1940  MRN: [id number]  Referring provider: Madeleine Arevalo MD  Dx:   Encounter Diagnosis     ICD-10-CM    1  S/P TKR (total knee replacement), right  Z96 651                   Subjective: Mario reports his knee has been sore since LV      Objective: See treatment diary below      Assessment: Tolerated treatment well  Patient exhibited good technique with therapeutic exercises  Applied 5 inch TG to mid thigh to try to help decrease swelling  Demonstrated to patient how to don properly and educated on signs of if it becomes too tight (if swelling were to increase)  Patient expressed understanding  Plan: Progress treatment as tolerated         Precautions: lumbar fusion      Manuals 3/24 3/26           R patellar mobs JE JE           R knee PROM JE JE                                     Neuro Re-Ed                                                                                                        Ther Ex             Quad sets 5" x20 5"x30           SLR 2x10 2x8 w/ assist           Ankle pumps 30 HEP           Supine hip abd  20 HEP           Heel slides 20 30           gastroc stretch 30"x3 30"x3           HS stretch 30"x2            SAQ 2x10 2x10           LAQ 2x10 2x10 w/ assist           Heel raises  2x10           Stand hip abd  2x10           Stand hip ext  10           Stand HS curl  2x10                                                  Ther Activity                                       Gait Training                                       Modalities             ice  10'

## 2021-03-29 ENCOUNTER — OFFICE VISIT (OUTPATIENT)
Dept: PHYSICAL THERAPY | Facility: MEDICAL CENTER | Age: 81
End: 2021-03-29
Payer: MEDICARE

## 2021-03-29 DIAGNOSIS — Z96.651 S/P TKR (TOTAL KNEE REPLACEMENT), RIGHT: Primary | ICD-10-CM

## 2021-03-29 PROCEDURE — 97140 MANUAL THERAPY 1/> REGIONS: CPT | Performed by: PHYSICAL THERAPIST

## 2021-03-29 PROCEDURE — 97110 THERAPEUTIC EXERCISES: CPT | Performed by: PHYSICAL THERAPIST

## 2021-03-29 NOTE — PROGRESS NOTES
Daily Note     Today's date: 3/29/2021  Patient name: Dominick Becker  : 1940  MRN: [id number]  Referring provider: Edi Lucero MD  Dx:   Encounter Diagnosis     ICD-10-CM    1  S/P TKR (total knee replacement), right  Z96 651                   Subjective: Mario reports his knee was really sore this weekend  He didn't do his exercises  Objective: See treatment diary below -5-118 deg ROM      Assessment: Tolerated treatment well  Patient demonstrated fatigue post treatment      Plan: Progress treatment as tolerated         Precautions: lumbar fusion      Manuals 3/24 3/26 3/29          R patellar mobs JONE BECERRIL JE          R knee PROM JONE BECERRIL JE                                    Neuro Re-Ed                                                                                                        Ther Ex             Quad sets 5" x20 5"x30 5"x30          SLR 2x10 2x8 w/ assist 2x10          Ankle pumps 30 HEP           Supine hip abd  20 HEP           Heel slides 20 30 30          gastroc stretch 30"x3 30"x3 30"x3          HS stretch 30"x2            SAQ 2x10 2x10 3x10          LAQ 2x10 2x10 w/ assist 3x10          Heel raises  2x10 3x10          Stand hip abd  2x10 3x10          Stand hip ext  10 20          Stand HS curl  2x10 3x10          Side stepping   20'x6          bike   3'                       Ther Activity                                       Gait Training                                       Modalities             ice  10'

## 2021-04-02 ENCOUNTER — OFFICE VISIT (OUTPATIENT)
Dept: PHYSICAL THERAPY | Facility: MEDICAL CENTER | Age: 81
End: 2021-04-02
Payer: MEDICARE

## 2021-04-02 DIAGNOSIS — Z96.651 S/P TKR (TOTAL KNEE REPLACEMENT), RIGHT: Primary | ICD-10-CM

## 2021-04-02 PROCEDURE — 97140 MANUAL THERAPY 1/> REGIONS: CPT | Performed by: PHYSICAL THERAPIST

## 2021-04-02 PROCEDURE — 97110 THERAPEUTIC EXERCISES: CPT | Performed by: PHYSICAL THERAPIST

## 2021-04-02 NOTE — PROGRESS NOTES
Daily Note     Today's date: 2021  Patient name: Manolo Tucker  : 1940  MRN: [id number]  Referring provider: Bharath Ray MD  Dx:   Encounter Diagnosis     ICD-10-CM    1  S/P TKR (total knee replacement), right  Z96 651                   Subjective: Mario reports his knee is feeling better than on Monday and he has been doing his exercises      Objective: See treatment diary below      Assessment: Tolerated treatment well  Patient demonstrated fatigue post treatment and exhibited good technique with therapeutic exercises      Plan: Progress treatment as tolerated         Precautions: lumbar fusion      Manuals 3/24 3/26 3/29 4/2         R patellar mobs JE JONE JE JE         R knee PROM JE JONE JE JE                                   Neuro Re-Ed                                                                                                        Ther Ex             Quad sets 5" x20 5"x30 5"x30 5"x30         SLR 2x10 2x8 w/ assist 2x10 3x10         Ankle pumps 30 HEP           Supine hip abd  20 HEP           Heel slides 20 30 30 30         gastroc stretch 30"x3 30"x3 30"x3 30"x3         HS stretch 30"x2            SAQ 2x10 2x10 3x10 3x10         LAQ 2x10 2x10 w/ assist 3x10 3x10         Heel raises  2x10 3x10 3x10         Stand hip abd  2x10 3x10 3x10         Stand hip ext  10 20 3x10         Stand HS curl  2x10 3x10 3x10         Side stepping   20'x6 20'x8         bike   3' 5'                      Ther Activity                                       Gait Training                                       Modalities             ice  10'

## 2021-04-05 ENCOUNTER — OFFICE VISIT (OUTPATIENT)
Dept: PHYSICAL THERAPY | Facility: MEDICAL CENTER | Age: 81
End: 2021-04-05
Payer: MEDICARE

## 2021-04-05 DIAGNOSIS — Z96.651 S/P TKR (TOTAL KNEE REPLACEMENT), RIGHT: Primary | ICD-10-CM

## 2021-04-05 PROCEDURE — 97110 THERAPEUTIC EXERCISES: CPT | Performed by: PHYSICAL THERAPIST

## 2021-04-05 PROCEDURE — 97140 MANUAL THERAPY 1/> REGIONS: CPT | Performed by: PHYSICAL THERAPIST

## 2021-04-05 NOTE — PROGRESS NOTES
Daily Note     Today's date: 2021  Patient name: Coby Dowell  : 1940  MRN: [id number]  Referring provider: Yary Baez MD  Dx:   Encounter Diagnosis     ICD-10-CM    1  S/P TKR (total knee replacement), right  Z96 651                   Subjective: Mario reports he had soreness all over yesterday and a little bit today  He has been walking with SPC      Objective: See treatment diary below      Assessment: Tolerated treatment well  Patient exhibited good technique with therapeutic exercises      Plan: Progress treatment as tolerated         Precautions: lumbar fusion      Manuals 3/24 3/26 3/29 4/2 4/5        R patellar mobs JE JONE BECERRIL JE JE        R knee PROM JE JONE BECERRIL JE JE                                  Neuro Re-Ed                                                                                                        Ther Ex             Quad sets 5" x20 5"x30 5"x30 5"x30 x30        SLR 2x10 2x8 w/ assist 2x10 3x10 3x10        Ankle pumps 30 HEP           Supine hip abd  20 HEP           Heel slides 20 30 30 30 30        gastroc stretch 30"x3 30"x3 30"x3 30"x3 30"x3        HS stretch 30"x2            SAQ 2x10 2x10 3x10 3x10 3x10        LAQ 2x10 2x10 w/ assist 3x10 3x10 3x10        Heel raises  2x10 3x10 3x10 3x10        Stand hip abd  2x10 3x10 3x10 3x10        Stand hip ext  10 20 3x10 3x10        Stand HS curl  2x10 3x10 3x10 3x10        Side stepping   20'x6 20'x8 20'x10        bike   3' 5' 7'        S/L hip abd             Step ups     L1 x20                     Ther Activity                                       Gait Training                                       Modalities             ice  10' Spoke to patient, he states he has fever and body aches. Patient states that he would like to change his visit to a video/telephone visit. Advised patient that MD usually likes to see patient in person for the first visit for exam.      Patient states he was previously seen by [facility] and was getting RFA's done every 6-8 months, he states he is overdue for one and is needing to get it repeated. Advised patient that we do have some records, but no recent records.       Advised that message will be discussed with MD.

## 2021-04-09 ENCOUNTER — OFFICE VISIT (OUTPATIENT)
Dept: PHYSICAL THERAPY | Facility: MEDICAL CENTER | Age: 81
End: 2021-04-09
Payer: MEDICARE

## 2021-04-09 DIAGNOSIS — Z96.651 S/P TKR (TOTAL KNEE REPLACEMENT), RIGHT: Primary | ICD-10-CM

## 2021-04-09 PROCEDURE — 97110 THERAPEUTIC EXERCISES: CPT | Performed by: PHYSICAL THERAPIST

## 2021-04-09 PROCEDURE — 97140 MANUAL THERAPY 1/> REGIONS: CPT | Performed by: PHYSICAL THERAPIST

## 2021-04-09 NOTE — PROGRESS NOTES
Daily Note     Today's date: 2021  Patient name: Indu Ta  : 1940  MRN: [id number]  Referring provider: Julien Olsen MD  Dx:   Encounter Diagnosis     ICD-10-CM    1  S/P TKR (total knee replacement), right  Z96 651                   Subjective: Mario reports his knee has been feeling ok, he was released to drive and drove for the 1st time today      Objective: See treatment diary below      Assessment: Tolerated treatment well  Patient demonstrated fatigue post treatment      Plan: Progress treatment as tolerated         Precautions: lumbar fusion      Manuals 3/24 3/26 3/29 4/2 4/5 4/9       R patellar mobs JE JONE BECERRIL JE JE       R knee PROM JE JONE BECERRIL JE JE JE                                 Neuro Re-Ed                                                                                                        Ther Ex             Quad sets 5" x20 5"x30 5"x30 5"x30 x30 x30       SLR 2x10 2x8 w/ assist 2x10 3x10 3x10 3x10       Ankle pumps 30 HEP           Supine hip abd  20 HEP           Heel slides 20 30 30 30 30 30       gastroc stretch 30"x3 30"x3 30"x3 30"x3 30"x3 30"x3       HS stretch 30"x2            SAQ 2x10 2x10 3x10 3x10 3x10 3x10       LAQ 2x10 2x10 w/ assist 3x10 3x10 3x10 3x10       Heel raises  2x10 3x10 3x10 3x10 3x10       Stand hip abd  2x10 3x10 3x10 3x10        Stand hip ext  10 20 3x10 3x10 3x10       Stand HS curl  2x10 3x10 3x10 3x10 3x10       Side stepping   20'x6 20'x8 20'x10 20'x10       bike   3' 5' 7' 8'       S/L hip abd      3x10       Step ups     L1 x20 L2 15       Lat step ups      L2 15       Ther Activity                                       Gait Training                                       Modalities             ice  10'

## 2021-04-12 ENCOUNTER — APPOINTMENT (OUTPATIENT)
Dept: PHYSICAL THERAPY | Facility: MEDICAL CENTER | Age: 81
End: 2021-04-12
Payer: MEDICARE

## 2021-04-16 ENCOUNTER — OFFICE VISIT (OUTPATIENT)
Dept: PHYSICAL THERAPY | Facility: MEDICAL CENTER | Age: 81
End: 2021-04-16
Payer: MEDICARE

## 2021-04-16 DIAGNOSIS — Z96.651 S/P TKR (TOTAL KNEE REPLACEMENT), RIGHT: Primary | ICD-10-CM

## 2021-04-16 PROCEDURE — 97110 THERAPEUTIC EXERCISES: CPT | Performed by: PHYSICAL THERAPIST

## 2021-04-16 PROCEDURE — 97140 MANUAL THERAPY 1/> REGIONS: CPT | Performed by: PHYSICAL THERAPIST

## 2021-04-16 NOTE — PROGRESS NOTES
Daily Note     Today's date: 2021  Patient name: Mary Lou Ernandez  : 1940  MRN: [id number]  Referring provider: Mercedez Guzmán MD  Dx:   Encounter Diagnosis     ICD-10-CM    1  S/P TKR (total knee replacement), right  Z96 651                   Subjective: Mario reports his knee feels a little stiff      Objective: See treatment diary below      Assessment: Tolerated treatment well  Patient knee ROM 0-130      Plan: Progress treatment as tolerated         Precautions: lumbar fusion      Manuals 3/24 3/26 3/29 4/2 4/5 4/9 4/16      R patellar mobs JE JE JE JE JE JE JE      R knee PROM JE JE JE JE JE JE JE                                Neuro Re-Ed                                                                                                        Ther Ex             Quad sets 5" x20 5"x30 5"x30 5"x30 x30 x30 x30      SLR 2x10 2x8 w/ assist 2x10 3x10 3x10 3x10 3x10      Ankle pumps 30 HEP           Supine hip abd  20 HEP           Heel slides 20 30 30 30 30 30 30      gastroc stretch 30"x3 30"x3 30"x3 30"x3 30"x3 30"x3 30"x3      HS stretch 30"x2            SAQ 2x10 2x10 3x10 3x10 3x10 3x10 3x10      LAQ 2x10 2x10 w/ assist 3x10 3x10 3x10 3x10 3x10      Heel raises  2x10 3x10 3x10 3x10 3x10 3x10      Stand hip abd  2x10 3x10 3x10 3x10        Stand hip ext  10 20 3x10 3x10 3x10 3x10      Stand HS curl  2x10 3x10 3x10 3x10 3x10 3x10      Side stepping   20'x6 20'x8 20'x10 20'x10 20'x10      bike   3' 5' 7' 8'       S/L hip abd      3x10 3x10      Step ups     L1 x20 L2 15 L2 15      Lat step ups      L2 15 L2 x15      Ther Activity                                       Gait Training             treadmill       5'                   Modalities             ice  10'

## 2021-04-19 ENCOUNTER — OFFICE VISIT (OUTPATIENT)
Dept: PHYSICAL THERAPY | Facility: MEDICAL CENTER | Age: 81
End: 2021-04-19
Payer: MEDICARE

## 2021-04-19 DIAGNOSIS — Z96.651 S/P TKR (TOTAL KNEE REPLACEMENT), RIGHT: Primary | ICD-10-CM

## 2021-04-19 PROCEDURE — 97140 MANUAL THERAPY 1/> REGIONS: CPT | Performed by: PHYSICAL THERAPIST

## 2021-04-19 PROCEDURE — 97110 THERAPEUTIC EXERCISES: CPT | Performed by: PHYSICAL THERAPIST

## 2021-04-19 NOTE — PROGRESS NOTES
Daily Note     Today's date: 2021  Patient name: Lisset Davis  : 1940  MRN: [id number]  Referring provider: Ata Lopez MD  Dx:   Encounter Diagnosis     ICD-10-CM    1  S/P TKR (total knee replacement), right  Z96 651                   Subjective: Mario reports his knee feels stiff in the morning but then it loosens up after he moves around      Objective: See treatment diary below      Assessment: Tolerated treatment well  Patient would benefit from continued PT      Plan: Progress treatment as tolerated         Precautions: lumbar fusion      Manuals 3/24 3/26 3/29 4/2 4/5 4/9 4/16 4/19     R patellar mobs JE JE JE JE JE JE JE JE     R knee PROM JE JE JE JE JE JE JE JE                               Neuro Re-Ed                                                                                                        Ther Ex             Quad sets 5" x20 5"x30 5"x30 5"x30 x30 x30 x30 x30     SLR 2x10 2x8 w/ assist 2x10 3x10 3x10 3x10 3x10 3x10     Ankle pumps 30 HEP           Supine hip abd  20 HEP           Heel slides 20 30 30 30 30 30 30 30     gastroc stretch 30"x3 30"x3 30"x3 30"x3 30"x3 30"x3 30"x3 30"x3     HS stretch 30"x2            SAQ 2x10 2x10 3x10 3x10 3x10 3x10 3x10      LAQ 2x10 2x10 w/ assist 3x10 3x10 3x10 3x10 3x10 3x10     Heel raises  2x10 3x10 3x10 3x10 3x10 3x10 3x10     Stand hip abd  2x10 3x10 3x10 3x10        Stand hip ext  10 20 3x10 3x10 3x10 3x10 3x10     Stand HS curl  2x10 3x10 3x10 3x10 3x10 3x10 3x10     Side stepping   20'x6 20'x8 20'x10 20'x10 20'x10 20'x10     bike   3' 5' 7' 8'       S/L hip abd      3x10 3x10 3x10     Step ups     L1 x20 L2 15 L2 15 L2 15     Lat step ups      L2 15 L2 x15 L2 15     Ther Activity                                       Gait Training             treadmill       5' 5'                  Modalities             ice  10'

## 2021-04-23 ENCOUNTER — OFFICE VISIT (OUTPATIENT)
Dept: PHYSICAL THERAPY | Facility: MEDICAL CENTER | Age: 81
End: 2021-04-23
Payer: MEDICARE

## 2021-04-23 DIAGNOSIS — Z96.651 S/P TKR (TOTAL KNEE REPLACEMENT), RIGHT: Primary | ICD-10-CM

## 2021-04-23 PROCEDURE — 97140 MANUAL THERAPY 1/> REGIONS: CPT | Performed by: PHYSICAL THERAPIST

## 2021-04-23 PROCEDURE — 97110 THERAPEUTIC EXERCISES: CPT | Performed by: PHYSICAL THERAPIST

## 2021-04-23 NOTE — PROGRESS NOTES
Daily Note     Today's date: 2021  Patient name: Ciro Lucero  : 1940  MRN: [id number]  Referring provider: Sheyla Harry MD  Dx:   Encounter Diagnosis     ICD-10-CM    1  S/P TKR (total knee replacement), right  Z96 651                   Subjective: Mario reports he was driving his tractor and using his right foot to operate the clutch      Objective: See treatment diary below      Assessment: Tolerated treatment well  Patient exhibited good technique with therapeutic exercises      Plan: Progress treatment as tolerated         Precautions: lumbar fusion      Manuals 3/24 3/26 3/29 4/2 4/5 4/9 4/16 4/19 4/23    R patellar mobs JE JE JE JE JE JE JE JE JE    R knee PROM JE JE JE JE JE JE JE JE JE                              Neuro Re-Ed                                                                                                        Ther Ex             Quad sets 5" x20 5"x30 5"x30 5"x30 x30 x30 x30 x30 x30    SLR 2x10 2x8 w/ assist 2x10 3x10 3x10 3x10 3x10 3x10 3x10    Ankle pumps 30 HEP           Supine hip abd  20 HEP           Heel slides 20 30 30 30 30 30 30 30 30    gastroc stretch 30"x3 30"x3 30"x3 30"x3 30"x3 30"x3 30"x3 30"x3 30"x3    HS stretch 30"x2            SAQ 2x10 2x10 3x10 3x10 3x10 3x10 3x10      LAQ 2x10 2x10 w/ assist 3x10 3x10 3x10 3x10 3x10 3x10 3x10    Heel raises  2x10 3x10 3x10 3x10 3x10 3x10 3x10 3x10    Stand hip abd  2x10 3x10 3x10 3x10        Stand hip ext  10 20 3x10 3x10 3x10 3x10 3x10 3x10    Stand HS curl  2x10 3x10 3x10 3x10 3x10 3x10 3x10 3x10    Side stepping   20'x6 20'x8 20'x10 20'x10 20'x10 20'x10 3x10    bike   3' 5' 7' 8'       S/L hip abd      3x10 3x10 3x10 3x10    Step ups     L1 x20 L2 15 L2 15 L2 15 L2 15    Lat step ups      L2 15 L2 x15 L2 15 L2 15    Ther Activity                                       Gait Training             treadmill       5' 5' 5'                 Modalities             ice  10'

## 2021-04-26 ENCOUNTER — OFFICE VISIT (OUTPATIENT)
Dept: PHYSICAL THERAPY | Facility: MEDICAL CENTER | Age: 81
End: 2021-04-26
Payer: MEDICARE

## 2021-04-26 DIAGNOSIS — Z96.651 S/P TKR (TOTAL KNEE REPLACEMENT), RIGHT: Primary | ICD-10-CM

## 2021-04-26 PROCEDURE — 97140 MANUAL THERAPY 1/> REGIONS: CPT | Performed by: PHYSICAL THERAPIST

## 2021-04-26 PROCEDURE — 97110 THERAPEUTIC EXERCISES: CPT | Performed by: PHYSICAL THERAPIST

## 2021-04-26 NOTE — PROGRESS NOTES
Daily Note     Today's date: 2021  Patient name: Maame Dee  : 1940  MRN: [id number]  Referring provider: Jose Antonio Posey MD  Dx:   Encounter Diagnosis     ICD-10-CM    1  S/P TKR (total knee replacement), right  Z96 651                   Subjective: Mario reports his knee is sore on the outside      Objective: See treatment diary below      Assessment: Tolerated treatment well  Patient demonstrated fatigue post treatment      Plan: Progress treatment as tolerated         Precautions: lumbar fusion      Manuals 3/24 3/26 3/29 4/2 4/5 4/9 4/16 4/19 4/23 4/26   R patellar mobs JE JE JE JE JE JE JE JE JE JE   R knee PROM JE JE JE JE JE JE JE JE JE JE                             Neuro Re-Ed                                                                                                        Ther Ex             Quad sets 5" x20 5"x30 5"x30 5"x30 x30 x30 x30 x30 x30 30   SLR 2x10 2x8 w/ assist 2x10 3x10 3x10 3x10 3x10 3x10 3x10 3x10   Ankle pumps 30 HEP           Supine hip abd  20 HEP           Heel slides 20 30 30 30 30 30 30 30 30 30   gastroc stretch 30"x3 30"x3 30"x3 30"x3 30"x3 30"x3 30"x3 30"x3 30"x3 30"x3   HS stretch 30"x2            SAQ 2x10 2x10 3x10 3x10 3x10 3x10 3x10      LAQ 2x10 2x10 w/ assist 3x10 3x10 3x10 3x10 3x10 3x10 3x10 3x10   Heel raises  2x10 3x10 3x10 3x10 3x10 3x10 3x10 3x10 3x10   Stand hip abd  2x10 3x10 3x10 3x10        Stand hip ext  10 20 3x10 3x10 3x10 3x10 3x10 3x10 3x10   Stand HS curl  2x10 3x10 3x10 3x10 3x10 3x10 3x10 3x10 3x10   Side stepping   20'x6 20'x8 20'x10 20'x10 20'x10 20'x10 3x10 3x10   bike   3' 5' 7' 8'       S/L hip abd      3x10 3x10 3x10 3x10 3x10   Step ups     L1 x20 L2 15 L2 15 L2 15 L2 15 20   Lat step ups      L2 15 L2 x15 L2 15 L2 15 20   Ther Activity                                       Gait Training             treadmill       5' 5' 5' 5'                Modalities             ice  10'

## 2021-04-30 ENCOUNTER — OFFICE VISIT (OUTPATIENT)
Dept: PHYSICAL THERAPY | Facility: MEDICAL CENTER | Age: 81
End: 2021-04-30
Payer: MEDICARE

## 2021-04-30 DIAGNOSIS — Z96.651 S/P TKR (TOTAL KNEE REPLACEMENT), RIGHT: Primary | ICD-10-CM

## 2021-04-30 PROCEDURE — 97110 THERAPEUTIC EXERCISES: CPT | Performed by: PHYSICAL THERAPIST

## 2021-05-03 ENCOUNTER — OFFICE VISIT (OUTPATIENT)
Dept: PHYSICAL THERAPY | Facility: MEDICAL CENTER | Age: 81
End: 2021-05-03
Payer: MEDICARE

## 2021-05-03 DIAGNOSIS — Z96.651 S/P TKR (TOTAL KNEE REPLACEMENT), RIGHT: Primary | ICD-10-CM

## 2021-05-03 PROCEDURE — 97110 THERAPEUTIC EXERCISES: CPT | Performed by: PHYSICAL THERAPIST

## 2021-05-03 NOTE — PROGRESS NOTES
Daily Note     Today's date: 5/3/2021  Patient name: Sharon Sexton  : 1940  MRN: [id number]  Referring provider: Yaz Walker MD  Dx:   Encounter Diagnosis     ICD-10-CM    1  S/P TKR (total knee replacement), right  Z96 651                   Subjective: Mario reports his knee feels stiff after sitting for about 15' but then it loosens up      Objective: See treatment diary below      Assessment: Tolerated treatment well  Patient exhibited good technique with therapeutic exercises      Plan: Progress treatment as tolerated         Precautions: lumbar fusion      Manuals 5/3 3/26 3/29 4/2 4/5 4/9 4/16 4/19 4/23 4/30   R patellar mobs JE JE JE JE JE JE JE JE JE JE   R knee PROM JE JE JE JE JE JE JE JE JE JE                             Neuro Re-Ed                                                                                                        Ther Ex             Quad sets 5" x30 5"x30 5"x30 5"x30 x30 x30 x30 x30 x30 30   SLR 3x10 2x8 w/ assist 2x10 3x10 3x10 3x10 3x10 3x10 3x10 3x10                             Heel slides 23 30 30 30 30 30 30 30 30 30   gastroc stretch 30"x3 30"x3 30"x3 30"x3 30"x3 30"x3 30"x3 30"x3 30"x3 30"x3                             LAQ 3x10 2x10 w/ assist 3x10 3x10 3x10 3x10 3x10 3x10 3x10 3x10   Heel raises 3x10 2x10 3x10 3x10 3x10 3x10 3x10 3x10 3x10 3x10                Stand hip ext 3x10 10 20 3x10 3x10 3x10 3x10 3x10 3x10 3x10   Stand HS curl 3x10 2x10 3x10 3x10 3x10 3x10 3x10 3x10 3x10 3x10   Side stepping 20'x8  20'x6 20'x8 20'x10 20'x10 20'x10 20'x10 3x10 3x10   bike   3' 5' 7' 8'       S/L hip abd 30     3x10 3x10 3x10 3x10 3x10   Step ups 30    L1 x20 L2 15 L2 15 L2 15 L2 15 20   Lat step ups 30     L2 15 L2 x15 L2 15 L2 15 20   Ther Activity                                       Gait Training             treadmill       5' 5' 5' 5'                Modalities             ice

## 2021-05-07 ENCOUNTER — OFFICE VISIT (OUTPATIENT)
Dept: PHYSICAL THERAPY | Facility: MEDICAL CENTER | Age: 81
End: 2021-05-07
Payer: MEDICARE

## 2021-05-07 DIAGNOSIS — Z96.651 S/P TKR (TOTAL KNEE REPLACEMENT), RIGHT: Primary | ICD-10-CM

## 2021-05-07 PROCEDURE — 97110 THERAPEUTIC EXERCISES: CPT | Performed by: PHYSICAL THERAPIST

## 2021-05-07 NOTE — PROGRESS NOTES
Daily Note     Today's date: 2021  Patient name: Ciro Lucero  : 1940  MRN: [id number]  Referring provider: Sheyla Harry MD  Dx:   Encounter Diagnosis     ICD-10-CM    1  S/P TKR (total knee replacement), right  Z96 651                   Subjective: Mario reports his knee feels pretty good      Objective: See treatment diary below      Assessment: Tolerated treatment well  Patient exhibited good technique with therapeutic exercises      Plan: Progress treatment as tolerated         Precautions: lumbar fusion      Manuals 5/3 5/7 3/29 4/2 4/5 4/9 4/16 4/19 4/23 4/30   R patellar mobs JE JE JE JE JE JE JE JE JE JE   R knee PROM JE JE JE JE JE JE JE JE JE JE                             Neuro Re-Ed                                                                                                        Ther Ex             Quad sets 5" x30 5"x30 5"x30 5"x30 x30 x30 x30 x30 x30 30   SLR 3x10 3x10 2x10 3x10 3x10 3x10 3x10 3x10 3x10 3x10                             Heel slides 23 30 30 30 30 30 30 30 30 30   gastroc stretch 30"x3 30"x3 30"x3 30"x3 30"x3 30"x3 30"x3 30"x3 30"x3 30"x3                             LAQ 3x10 3x10  3x10 3x10 3x10 3x10 3x10 3x10 3x10 3x10   Heel raises 3x10 3x10 3x10 3x10 3x10 3x10 3x10 3x10 3x10 3x10                Stand hip ext 3x10 10 20 3x10 3x10 3x10 3x10 3x10 3x10 3x10   Stand HS curl 3x10 3x10 3x10 3x10 3x10 3x10 3x10 3x10 3x10 3x10   Side stepping 20'x8 20'8 20'x6 20'x8 20'x10 20'x10 20'x10 20'x10 3x10 3x10   bike  5' 3' 5' 7' 8'       S/L hip abd 30 30    3x10 3x10 3x10 3x10 3x10   Step ups 30 30   L1 x20 L2 15 L2 15 L2 15 L2 15 20   Lat step ups 30 30    L2 15 L2 x15 L2 15 L2 15 20   Ther Activity                                       Gait Training             treadmill       5' 5' 5' 5'                Modalities             ice

## 2021-05-10 ENCOUNTER — OFFICE VISIT (OUTPATIENT)
Dept: PHYSICAL THERAPY | Facility: MEDICAL CENTER | Age: 81
End: 2021-05-10
Payer: MEDICARE

## 2021-05-10 DIAGNOSIS — Z96.651 S/P TKR (TOTAL KNEE REPLACEMENT), RIGHT: Primary | ICD-10-CM

## 2021-05-10 PROCEDURE — 97110 THERAPEUTIC EXERCISES: CPT | Performed by: PHYSICAL THERAPIST

## 2021-05-14 ENCOUNTER — OFFICE VISIT (OUTPATIENT)
Dept: PHYSICAL THERAPY | Facility: MEDICAL CENTER | Age: 81
End: 2021-05-14
Payer: MEDICARE

## 2021-05-14 DIAGNOSIS — Z96.651 S/P TKR (TOTAL KNEE REPLACEMENT), RIGHT: Primary | ICD-10-CM

## 2021-05-14 PROCEDURE — 97110 THERAPEUTIC EXERCISES: CPT | Performed by: PHYSICAL THERAPIST

## 2021-05-14 NOTE — PROGRESS NOTES
Daily Note     Today's date: 2021  Patient name: Nelson Nieves  : 1940  MRN: [id number]  Referring provider: Aries Alvarado MD  Dx:   Encounter Diagnosis     ICD-10-CM    1  S/P TKR (total knee replacement), right  Z96 651                   Subjective: Mario reports his knee has been doing ok      Objective: See treatment diary below      Assessment: Tolerated treatment well  Patient exhibited good technique with therapeutic exercises      Plan: Progress treatment as tolerated         Precautions: lumbar fusion      Manuals 5/3 5/7 5/10 5/14 4/5 4/9 4/16 4/19 4/23 4/30   R patellar mobs JE JE JE JE JE JE JE JE JE JE   R knee PROM JE JE JE JE JE JE JE JE JE JE                             Neuro Re-Ed                                                                                                        Ther Ex             Quad sets 5" x30 5"x30 5"x30 5"x30 x30 x30 x30 x30 x30 30   SLR 3x10 3x10 3x10 3x10 3x10 3x10 3x10 3x10 3x10 3x10                             Heel slides 23 30 30 30 30 30 30 30 30 30   gastroc stretch 30"x3 30"x3 30"x3 30"x3 30"x3 30"x3 30"x3 30"x3 30"x3 30"x3                             LAQ 3x10 3x10  3x10 3x10 3x10 3x10 3x10 3x10 3x10 3x10   Heel raises 3x10 3x10 3x10 3x10 3x10 3x10 3x10 3x10 3x10 3x10                Stand hip ext 3x10 10 3x10 3x10 3x10 3x10 3x10 3x10 3x10 3x10   Stand HS curl 3x10 3x10 3x10 3x10 3x10 3x10 3x10 3x10 3x10 3x10   Side stepping 20'x8 20'8 20'x8 20'x8 20'x10 20'x10 20'x10 20'x10 3x10 3x10   bike  5' 5' 5' 7' 8'       S/L hip abd 30 30 30 30  3x10 3x10 3x10 3x10 3x10   Step ups 30 30 30 30 L1 x20 L2 15 L2 15 L2 15 L2 15 20   Lat step ups 30 30 30 30  L2 15 L2 x15 L2 15 L2 15 20   Ther Activity                                       Gait Training             treadmill       5' 5' 5' 5'                Modalities             ice

## 2021-05-17 ENCOUNTER — APPOINTMENT (OUTPATIENT)
Dept: PHYSICAL THERAPY | Facility: MEDICAL CENTER | Age: 81
End: 2021-05-17
Payer: MEDICARE

## 2021-05-21 ENCOUNTER — OFFICE VISIT (OUTPATIENT)
Dept: PHYSICAL THERAPY | Facility: MEDICAL CENTER | Age: 81
End: 2021-05-21
Payer: MEDICARE

## 2021-05-21 DIAGNOSIS — Z96.651 S/P TKR (TOTAL KNEE REPLACEMENT), RIGHT: Primary | ICD-10-CM

## 2021-05-21 PROCEDURE — 97110 THERAPEUTIC EXERCISES: CPT | Performed by: PHYSICAL THERAPIST

## 2021-05-21 NOTE — PROGRESS NOTES
Daily Note     Today's date: 2021  Patient name: Mary Rae  : 1940  MRN: [id number]  Referring provider: Robson Jerry MD  Dx:   Encounter Diagnosis     ICD-10-CM    1  S/P TKR (total knee replacement), right  Z96 651                   Subjective: Mario reports his knee was stiff a day ago      Objective: See treatment diary below      Assessment: Tolerated treatment well  Patient demonstrated fatigue post treatment      Plan: Progress treatment as tolerated         Precautions: lumbar fusion      Manuals 5/3 5/7 5/10 5/14 5/21 4/9 4/16 4/19 4/23 4/30   R patellar mobs JE JE JE JE JE JE JE JE JE JE   R knee PROM JE JE JE JE JE JE JE JE JE JE                             Neuro Re-Ed                                                                                                        Ther Ex             Quad sets 5" x30 5"x30 5"x30 5"x30 x30 x30 x30 x30 x30 30   SLR 3x10 3x10 3x10 3x10 3x10 3x10 3x10 3x10 3x10 3x10                             Heel slides 23 30 30 30 30 30 30 30 30 30   gastroc stretch 30"x3 30"x3 30"x3 30"x3 30"x3 30"x3 30"x3 30"x3 30"x3 30"x3                             LAQ 3x10 3x10  3x10 3x10 3x10 3x10 3x10 3x10 3x10 3x10   Heel raises 3x10 3x10 3x10 3x10 3x10 3x10 3x10 3x10 3x10 3x10                Stand hip ext 3x10 10 3x10 3x10 3x10 3x10 3x10 3x10 3x10 3x10   Stand HS curl 3x10 3x10 3x10 3x10 3x10 3x10 3x10 3x10 3x10 3x10   Side stepping 20'x8 20'8 20'x8 20'x8 20'x10 20'x10 20'x10 20'x10 3x10 3x10   bike  5' 5' 5' 7' 8'       S/L hip abd 30 30 30 30 30 3x10 3x10 3x10 3x10 3x10   Step ups 30 30 30 30 L3 x20 L2 15 L2 15 L2 15 L2 15 20   Lat step ups 30 30 30 30 L3 20 L2 15 L2 x15 L2 15 L2 15 20   Ther Activity                                       Gait Training             treadmill       5' 5' 5' 5'                Modalities             ice

## 2021-05-24 ENCOUNTER — OFFICE VISIT (OUTPATIENT)
Dept: PHYSICAL THERAPY | Facility: MEDICAL CENTER | Age: 81
End: 2021-05-24
Payer: MEDICARE

## 2021-05-24 DIAGNOSIS — Z96.651 S/P TKR (TOTAL KNEE REPLACEMENT), RIGHT: Primary | ICD-10-CM

## 2021-05-24 PROCEDURE — 97110 THERAPEUTIC EXERCISES: CPT | Performed by: PHYSICAL THERAPIST

## 2021-05-24 NOTE — PROGRESS NOTES
Daily Note     Today's date: 2021  Patient name: Samuel Garcia  : 1940  MRN: [id number]  Referring provider: Angelica Strong MD  Dx:   Encounter Diagnosis     ICD-10-CM    1  S/P TKR (total knee replacement), right  Z96 651                   Subjective: Mario reports his knee feels good      Objective: See treatment diary below      Assessment: Tolerated treatment well  Patient would benefit from continued PT      Plan: Progress treatment as tolerated         Precautions: lumbar fusion      Manuals 5/3 5/7 5/10 5/14 5/21 5/24 4/16 4/19 4/23 4/30   R patellar mobs JE JE JE JE JE JE JE JE JE JE   R knee PROM JE JE JE JE JE JE JE JE JE JE                             Neuro Re-Ed                                                                                                        Ther Ex             Quad sets 5" x30 5"x30 5"x30 5"x30 x30 x30 x30 x30 x30 30   SLR 3x10 3x10 3x10 3x10 3x10 3x10 3x10 3x10 3x10 3x10                             Heel slides 23 30 30 30 30 30 30 30 30 30   gastroc stretch 30"x3 30"x3 30"x3 30"x3 30"x3 30"x3 30"x3 30"x3 30"x3 30"x3                             LAQ 3x10 3x10  3x10 3x10 3x10 3x10 3x10 3x10 3x10 3x10   Heel raises 3x10 3x10 3x10 3x10 3x10 3x10 3x10 3x10 3x10 3x10                Stand hip ext 3x10 10 3x10 3x10 3x10 3x10 3x10 3x10 3x10 3x10   Stand HS curl 3x10 3x10 3x10 3x10 3x10 3x10 3x10 3x10 3x10 3x10   Side stepping 20'x8 20'8 20'x8 20'x8 20'x10 20'x10 20'x10 20'x10 3x10 3x10   bike  5' 5' 5' 7' 8'       S/L hip abd 30 30 30 30 30 3x10 3x10 3x10 3x10 3x10   Step ups 30 30 30 30 L3 x20 L3 20 L2 15 L2 15 L2 15 20   Lat step ups 30 30 30 30 L3 20 L3 20 L2 x15 L2 15 L2 15 20   Ther Activity                                       Gait Training             treadmill       5' 5' 5' 5'                Modalities             ice

## 2021-05-28 ENCOUNTER — APPOINTMENT (OUTPATIENT)
Dept: PHYSICAL THERAPY | Facility: MEDICAL CENTER | Age: 81
End: 2021-05-28
Payer: MEDICARE

## 2021-06-02 ENCOUNTER — OFFICE VISIT (OUTPATIENT)
Dept: PHYSICAL THERAPY | Facility: MEDICAL CENTER | Age: 81
End: 2021-06-02
Payer: MEDICARE

## 2021-06-02 DIAGNOSIS — Z96.651 S/P TKR (TOTAL KNEE REPLACEMENT), RIGHT: Primary | ICD-10-CM

## 2021-06-02 PROCEDURE — 97110 THERAPEUTIC EXERCISES: CPT | Performed by: PHYSICAL THERAPIST

## 2021-06-02 NOTE — PROGRESS NOTES
Daily Note     Today's date: 2021  Patient name: Mikki Ag  : 1940  MRN: [id number]  Referring provider: Naveed Delacruz MD  Dx:   Encounter Diagnosis     ICD-10-CM    1  S/P TKR (total knee replacement), right  Z96 651                   Subjective: Mario reports his knee has been feeling good, stiff at times      Objective: See treatment diary below      Assessment: Tolerated treatment well  Patient exhibited good technique with therapeutic exercises      Plan: Progress treatment as tolerated         Precautions: lumbar fusion      Manuals 5/3 5/7 5/10 5/14 5/21 5/24 6/2 4/19 4/23 4/30   R patellar mobs JE JE JE JE JE JE JE JE JE JE   R knee PROM JE JE JE JE JE JE JE JE JE JE                             Neuro Re-Ed                                                                                                        Ther Ex             Quad sets 5" x30 5"x30 5"x30 5"x30 x30 x30 x30 x30 x30 30   SLR 3x10 3x10 3x10 3x10 3x10 3x10 3x10 3x10 3x10 3x10                             Heel slides 23 30 30 30 30 30 30 30 30 30   gastroc stretch 30"x3 30"x3 30"x3 30"x3 30"x3 30"x3 30"x3 30"x3 30"x3 30"x3                             LAQ 3x10 3x10  3x10 3x10 3x10 3x10 3x10 3x10 3x10 3x10   Heel raises 3x10 3x10 3x10 3x10 3x10 3x10 3x10 3x10 3x10 3x10                Stand hip ext 3x10 10 3x10 3x10 3x10 3x10 3x10 3x10 3x10 3x10   Stand HS curl 3x10 3x10 3x10 3x10 3x10 3x10 3x10 3x10 3x10 3x10   Side stepping 20'x8 20'8 20'x8 20'x8 20'x10 20'x10 20'x10 20'x10 3x10 3x10   bike  5' 5' 5' 7' 8' 5'      S/L hip abd 30 30 30 30 30 3x10 3x10 3x10 3x10 3x10   Step ups 30 30 30 30 L3 x20 L3 20 L3 30 L2 15 L2 15 20   Lat step ups 30 30 30 30 L3 20 L3 20 L3 30 L2 15 L2 15 20   Ther Activity                                       Gait Training             treadmill        5' 5' 5'                Modalities             ice

## 2021-06-04 ENCOUNTER — OFFICE VISIT (OUTPATIENT)
Dept: PHYSICAL THERAPY | Facility: MEDICAL CENTER | Age: 81
End: 2021-06-04
Payer: MEDICARE

## 2021-06-04 DIAGNOSIS — Z96.651 S/P TKR (TOTAL KNEE REPLACEMENT), RIGHT: Primary | ICD-10-CM

## 2021-06-04 PROCEDURE — 97110 THERAPEUTIC EXERCISES: CPT | Performed by: PHYSICAL THERAPIST

## 2021-06-04 NOTE — PROGRESS NOTES
Daily Note     Today's date: 2021  Patient name: Cornel Davenport  : 1940  MRN: [id number]  Referring provider: Cheli Kathleen MD  Dx:   Encounter Diagnosis     ICD-10-CM    1  S/P TKR (total knee replacement), right  Z96 651                   Subjective: Mario reports his knee feels good  Objective: See treatment diary below 0 -133 deg AROM      Assessment: Tolerated treatment well   Patient exhibited good technique with therapeutic exercises      Plan: Patient in agreement to dc to HEP     Precautions: lumbar fusion      Manuals 5/3 5/7 5/10 5/14 5/21 5/24 6/2 6/4 4/23 4/30   R patellar mobs JE JE JE JE JE JE JE JE JE JE   R knee PROM JE JE JE JE JE JE JE JE JE JE                             Neuro Re-Ed                                                                                                        Ther Ex             Quad sets 5" x30 5"x30 5"x30 5"x30 x30 x30 x30 x30 x30 30   SLR 3x10 3x10 3x10 3x10 3x10 3x10 3x10 3x10 3x10 3x10                             Heel slides 23 30 30 30 30 30 30 30 30 30   gastroc stretch 30"x3 30"x3 30"x3 30"x3 30"x3 30"x3 30"x3 30"x3 30"x3 30"x3                             LAQ 3x10 3x10  3x10 3x10 3x10 3x10 3x10 3x10 3x10 3x10   Heel raises 3x10 3x10 3x10 3x10 3x10 3x10 3x10 3x10 3x10 3x10                Stand hip ext 3x10 10 3x10 3x10 3x10 3x10 3x10 3x10 3x10 3x10   Stand HS curl 3x10 3x10 3x10 3x10 3x10 3x10 3x10 3x10 3x10 3x10   Side stepping 20'x8 20'8 20'x8 20'x8 20'x10 20'x10 20'x10 20'x10 3x10 3x10   bike  5' 5' 5' 7' 8' 5' 5'     S/L hip abd 30 30 30 30 30 3x10 3x10 3x10 3x10 3x10   Step ups 30 30 30 30 L3 x20 L3 20 L3 30 L2 15 L2 15 20   Lat step ups 30 30 30 30 L3 20 L3 20 L3 30 L2 15 L2 15 20   Ther Activity                                       Gait Training             treadmill         5' 5'                Modalities             ice

## 2021-06-07 ENCOUNTER — EVALUATION (OUTPATIENT)
Dept: PHYSICAL THERAPY | Facility: MEDICAL CENTER | Age: 81
End: 2021-06-07
Payer: MEDICARE

## 2021-06-07 ENCOUNTER — APPOINTMENT (OUTPATIENT)
Dept: PHYSICAL THERAPY | Facility: MEDICAL CENTER | Age: 81
End: 2021-06-07
Payer: MEDICARE

## 2021-06-07 ENCOUNTER — TRANSCRIBE ORDERS (OUTPATIENT)
Dept: PHYSICAL THERAPY | Facility: MEDICAL CENTER | Age: 81
End: 2021-06-07

## 2021-06-07 DIAGNOSIS — M25.511 ACUTE PAIN OF RIGHT SHOULDER: Primary | ICD-10-CM

## 2021-06-07 PROCEDURE — 97161 PT EVAL LOW COMPLEX 20 MIN: CPT | Performed by: PHYSICAL THERAPIST

## 2021-06-07 PROCEDURE — 97110 THERAPEUTIC EXERCISES: CPT | Performed by: PHYSICAL THERAPIST

## 2021-06-07 NOTE — PROGRESS NOTES
PT Evaluation     Today's date: 2021  Patient name: Katiuska Díaz  : 1940  MRN: [id number]  Referring provider: Rain Solomon DO  Dx:   Encounter Diagnosis     ICD-10-CM    1  Acute pain of right shoulder  M25 511                   Assessment  Assessment details: Katiuska Díaz is a 68 y o  male presents with acute pain of right shoulder  Katiuska Díaz has the below listed impairments and will benefit from skilled PT to improve deficits to return to prior level of function  Impairments: abnormal muscle firing, abnormal or restricted ROM, impaired physical strength and pain with function  Understanding of Dx/Px/POC: good   Prognosis: good    Goals  Impairment Goals  - Decrease pain to 0-2/10  - Improve ROM equal to contralateral side  - Increase strength equal to contralateral side    Functional Goals  - Patient will be independent with comprehensive HEP  - Patient will be able to drive his tractor without pain  - Patient will be able to reach for object overhead  - Patient will be able to lift/carry objects without provocation of symptoms    Plan  Patient would benefit from: skilled PT  Referral necessary: No  Planned therapy interventions: home exercise program, manual therapy, neuromuscular re-education, patient education, functional ROM exercises, strengthening, stretching and joint mobilization  Frequency: 2x week  Duration in weeks: 12  Treatment plan discussed with: patient        Subjective Evaluation    History of Present Illness  Mechanism of injury: Mario reports his right shoulder has been hurting for the last several months  About 2 years ago he fell on his shoulder but after a few months it started to feel better  Then several months ago he was starting a snowblower and he had onset of pain again  He also has intermittent clicking in his shoulder  He also reports intermittent pain in his neck  He has pain reaching out to the side for objects and when driving his tractor     Pain  At worst pain rating: 3    Patient Goals  Patient goals for therapy: decreased pain, increased motion and increased strength          Objective     Cervical/Thoracic Screen   Cervical range of motion within normal limits with the following exceptions: 50% rotation bilaterally  25% SB bilaterally  WNL flex & ext    Active Range of Motion     Right Shoulder   Flexion: 150 degrees   Abduction: 140 degrees   External rotation BTH: C5   Internal rotation BTB: lumbar     Passive Range of Motion     Right Shoulder   Flexion: 170 degrees   Abduction: 170 degrees   External rotation 90°: 90 degrees   Internal rotation 90°: 65 degrees     Strength/Myotome Testing     Right Shoulder     Planes of Motion   Flexion: 4+   Extension: 4   Abduction: 5   Adduction: 5   External rotation at 0°: 5   External rotation at 90°: 4-   Internal rotation at 0°: 5   Internal rotation at 90°: 5     Isolated Muscles   Lower trapezius: 3   Middle trapezius: 3   Supraspinatus: 4-     Tests   Cervical   Negative Sharp-Judie test and transverse ligament test      Right Shoulder   Negative drop arm                Precautions: none      Manuals 6/7            R shoulder PROM JE            Inf GH mobs                                       Neuro Re-Ed                                                                                                        Ther Ex             Serratus punch supine             Supine ABC's                                                                                           Ther Activity                                       Gait Training                                       Modalities

## 2021-06-07 NOTE — LETTER
2021    Marisabel Mccabe DO  [facility]    Patient: Ji Ku   YOB: 1940   Date of Visit: 2021     Encounter Diagnosis     ICD-10-CM    1  Acute pain of right shoulder  M25 511        Dear Dr Jorge Maria:    Thank you for your recent referral of Ji Ku  Please review the attached evaluation summary from Mario's recent visit  Please verify that you agree with the plan of care by signing the attached order  If you have any questions or concerns, please do not hesitate to call  I sincerely appreciate the opportunity to share in the care of one of your patients and hope to have another opportunity to work with you in the near future  Sincerely,    Gil Can, PT      Referring Provider:      I certify that I have read the below Plan of Care and certify the need for these services furnished under this plan of treatment while under my care  Marisabel Mccabe DO  [address] [address]  Via Fax: [phone]          PT Evaluation     Today's date: 2021  Patient name: Ji Ku  : 1940  MRN: [id number]  Referring provider: Keyonna Hagan DO  Dx:   Encounter Diagnosis     ICD-10-CM    1  Acute pain of right shoulder  M25 511                   Assessment  Assessment details: Ji Ku is a 68 y o  male presents with acute pain of right shoulder  Ji Ku has the below listed impairments and will benefit from skilled PT to improve deficits to return to prior level of function     Impairments: abnormal muscle firing, abnormal or restricted ROM, impaired physical strength and pain with function  Understanding of Dx/Px/POC: good   Prognosis: good    Goals  Impairment Goals  - Decrease pain to 0-2/10  - Improve ROM equal to contralateral side  - Increase strength equal to contralateral side    Functional Goals  - Patient will be independent with comprehensive HEP  - Patient will be able to drive his tractor without pain  - Patient will be able to reach for object overhead  - Patient will be able to lift/carry objects without provocation of symptoms    Plan  Patient would benefit from: skilled PT  Referral necessary: No  Planned therapy interventions: home exercise program, manual therapy, neuromuscular re-education, patient education, functional ROM exercises, strengthening, stretching and joint mobilization  Frequency: 2x week  Duration in weeks: 12  Treatment plan discussed with: patient        Subjective Evaluation    History of Present Illness  Mechanism of injury: Mario reports his right shoulder has been hurting for the last several months  About 2 years ago he fell on his shoulder but after a few months it started to feel better  Then several months ago he was starting a snowblower and he had onset of pain again  He also has intermittent clicking in his shoulder  He also reports intermittent pain in his neck  He has pain reaching out to the side for objects and when driving his tractor     Pain  At worst pain rating: 3    Patient Goals  Patient goals for therapy: decreased pain, increased motion and increased strength          Objective     Cervical/Thoracic Screen   Cervical range of motion within normal limits with the following exceptions: 50% rotation bilaterally  25% SB bilaterally  WNL flex & ext    Active Range of Motion     Right Shoulder   Flexion: 150 degrees   Abduction: 140 degrees   External rotation BTH: C5   Internal rotation BTB: lumbar     Passive Range of Motion     Right Shoulder   Flexion: 170 degrees   Abduction: 170 degrees   External rotation 90°: 90 degrees   Internal rotation 90°: 65 degrees     Strength/Myotome Testing     Right Shoulder     Planes of Motion   Flexion: 4+   Extension: 4   Abduction: 5   Adduction: 5   External rotation at 0°: 5   External rotation at 90°: 4-   Internal rotation at 0°: 5   Internal rotation at 90°: 5     Isolated Muscles   Lower trapezius: 3   Middle trapezius: 3   Supraspinatus: 4-     Tests   Cervical   Negative Sharp-Judie test and transverse ligament test      Right Shoulder   Negative drop arm                Precautions: none      Manuals 6/7            R shoulder PROM JE            Inf GH mobs                                       Neuro Re-Ed                                                                                                        Ther Ex             Serratus punch supine             Supine ABC's                                                                                           Ther Activity                                       Gait Training                                       Modalities

## 2021-06-11 ENCOUNTER — OFFICE VISIT (OUTPATIENT)
Dept: PHYSICAL THERAPY | Facility: MEDICAL CENTER | Age: 81
End: 2021-06-11
Payer: MEDICARE

## 2021-06-11 DIAGNOSIS — M25.511 ACUTE PAIN OF RIGHT SHOULDER: Primary | ICD-10-CM

## 2021-06-11 PROCEDURE — 97140 MANUAL THERAPY 1/> REGIONS: CPT | Performed by: PHYSICAL THERAPIST

## 2021-06-11 PROCEDURE — 97110 THERAPEUTIC EXERCISES: CPT | Performed by: PHYSICAL THERAPIST

## 2021-06-11 NOTE — PROGRESS NOTES
Daily Note     Today's date: 2021  Patient name: Angela Cee  : 1940  MRN: [id number]  Referring provider: Mery Dorado DO  Dx:   Encounter Diagnosis     ICD-10-CM    1  Acute pain of right shoulder  M25 511                   Subjective: Mario reports his shoulder feels better than last week       Objective: See treatment diary below      Assessment: Tolerated treatment well  Patient exhibited good technique with therapeutic exercises      Plan: Progress treatment as tolerated         Precautions: none      Manuals            R shoulder PROM JE JE           Inf GH mobs  JE Gr II-III                                     Neuro Re-Ed                                                                                                        Ther Ex             Serratus punch supine  20           Supine ABC's  1           Supine aarom flex  20           S/L ER  15           S/L flex  15                                                  Ther Activity                                       Gait Training                                       Modalities

## 2021-06-21 ENCOUNTER — OFFICE VISIT (OUTPATIENT)
Dept: PHYSICAL THERAPY | Facility: MEDICAL CENTER | Age: 81
End: 2021-06-21
Payer: MEDICARE

## 2021-06-21 DIAGNOSIS — M25.511 ACUTE PAIN OF RIGHT SHOULDER: Primary | ICD-10-CM

## 2021-06-21 PROCEDURE — 97140 MANUAL THERAPY 1/> REGIONS: CPT | Performed by: PHYSICAL THERAPIST

## 2021-06-21 PROCEDURE — 97110 THERAPEUTIC EXERCISES: CPT | Performed by: PHYSICAL THERAPIST

## 2021-06-21 NOTE — PROGRESS NOTES
Daily Note     Today's date: 2021  Patient name: Jose Manuel Ward  : 1940  MRN: [id number]  Referring provider: Merline Knack, DO  Dx:   Encounter Diagnosis     ICD-10-CM    1  Acute pain of right shoulder  M25 511                   Subjective: Mario reports his shoulder feels good      Objective: See treatment diary below      Assessment: Tolerated treatment well  Patient exhibited good technique with therapeutic exercises      Plan: Progress treatment as tolerated         Precautions: none      Manuals           R shoulder PROM JE JE JE          Inf GH mobs  JE Gr II-III JE Gr II                                    Neuro Re-Ed                                                                                                        Ther Ex             Serratus punch supine  20 30          Supine ABC's  1 1          Supine aarom flex  20           S/L ER  15 20          S/L flex  15 20          TB ext   20 R                                    Ther Activity                                       Gait Training                                       Modalities

## 2021-06-25 ENCOUNTER — OFFICE VISIT (OUTPATIENT)
Dept: PHYSICAL THERAPY | Facility: MEDICAL CENTER | Age: 81
End: 2021-06-25
Payer: MEDICARE

## 2021-06-25 DIAGNOSIS — M25.511 ACUTE PAIN OF RIGHT SHOULDER: Primary | ICD-10-CM

## 2021-06-25 PROCEDURE — 97110 THERAPEUTIC EXERCISES: CPT | Performed by: PHYSICAL THERAPIST

## 2021-06-25 PROCEDURE — 97140 MANUAL THERAPY 1/> REGIONS: CPT | Performed by: PHYSICAL THERAPIST

## 2021-06-25 NOTE — PROGRESS NOTES
Daily Note     Today's date: 2021  Patient name: Audie Garrido  : 1940  MRN: [id number]  Referring provider: Marcia Butcher DO  Dx:   Encounter Diagnosis     ICD-10-CM    1  Acute pain of right shoulder  M25 511                   Subjective: Mario reports he fell off a 3 step ladder on Wed and landed on his right side  He hit a concrete step with his hip and back  He went to Dr Kay Gibbs yesterday and xrays were negative  He reports his shoulder has been feeling really good and feels ready to dc to HEP      Objective: See treatment diary below      Assessment: Tolerated treatment well   Patient exhibited good technique with therapeutic exercises      Plan: DC to HEP     Precautions: none      Manuals          R shoulder PROM JE JE JE JE         Inf GH mobs  JE Gr II-III JE Gr II                                    Neuro Re-Ed                                                                                                        Ther Ex             Serratus punch supine  20 30 30         Supine ABC's  1 1          Supine aarom flex  20           S/L ER  15 20 20         S/L flex  15 20 2         TB ext   20 R 20 R                                   Ther Activity                                       Gait Training                                       Modalities

## 2021-10-01 ENCOUNTER — EVALUATION (OUTPATIENT)
Dept: PHYSICAL THERAPY | Facility: MEDICAL CENTER | Age: 81
End: 2021-10-01
Payer: MEDICARE

## 2021-10-01 DIAGNOSIS — M47.812 CERVICAL SPONDYLOSIS WITHOUT MYELOPATHY: Primary | ICD-10-CM

## 2021-10-01 PROCEDURE — 97140 MANUAL THERAPY 1/> REGIONS: CPT | Performed by: PHYSICAL THERAPIST

## 2021-10-01 PROCEDURE — 97161 PT EVAL LOW COMPLEX 20 MIN: CPT | Performed by: PHYSICAL THERAPIST

## 2021-10-01 RX ORDER — METHOCARBAMOL 500 MG/1
500 TABLET, FILM COATED ORAL 4 TIMES DAILY
COMMUNITY

## 2021-10-01 RX ORDER — ATORVASTATIN CALCIUM 40 MG/1
40 TABLET, FILM COATED ORAL DAILY
COMMUNITY

## 2021-10-04 ENCOUNTER — OFFICE VISIT (OUTPATIENT)
Dept: PHYSICAL THERAPY | Facility: MEDICAL CENTER | Age: 81
End: 2021-10-04
Payer: MEDICARE

## 2021-10-04 DIAGNOSIS — M47.812 CERVICAL SPONDYLOSIS WITHOUT MYELOPATHY: Primary | ICD-10-CM

## 2021-10-04 PROCEDURE — 97012 MECHANICAL TRACTION THERAPY: CPT | Performed by: PHYSICAL THERAPIST

## 2021-10-04 PROCEDURE — 97140 MANUAL THERAPY 1/> REGIONS: CPT | Performed by: PHYSICAL THERAPIST

## 2021-10-06 ENCOUNTER — OFFICE VISIT (OUTPATIENT)
Dept: PHYSICAL THERAPY | Facility: MEDICAL CENTER | Age: 81
End: 2021-10-06
Payer: MEDICARE

## 2021-10-06 DIAGNOSIS — M25.511 ACUTE PAIN OF RIGHT SHOULDER: ICD-10-CM

## 2021-10-06 DIAGNOSIS — M47.812 CERVICAL SPONDYLOSIS WITHOUT MYELOPATHY: Primary | ICD-10-CM

## 2021-10-06 PROCEDURE — 97140 MANUAL THERAPY 1/> REGIONS: CPT | Performed by: PHYSICAL THERAPIST

## 2021-10-11 ENCOUNTER — OFFICE VISIT (OUTPATIENT)
Dept: PHYSICAL THERAPY | Facility: MEDICAL CENTER | Age: 81
End: 2021-10-11
Payer: MEDICARE

## 2021-10-11 DIAGNOSIS — M47.812 CERVICAL SPONDYLOSIS WITHOUT MYELOPATHY: Primary | ICD-10-CM

## 2021-10-11 PROCEDURE — 97140 MANUAL THERAPY 1/> REGIONS: CPT | Performed by: PHYSICAL THERAPIST

## 2021-10-11 PROCEDURE — 97110 THERAPEUTIC EXERCISES: CPT | Performed by: PHYSICAL THERAPIST

## 2021-10-15 ENCOUNTER — OFFICE VISIT (OUTPATIENT)
Dept: PHYSICAL THERAPY | Facility: MEDICAL CENTER | Age: 81
End: 2021-10-15
Payer: MEDICARE

## 2021-10-15 DIAGNOSIS — M47.812 CERVICAL SPONDYLOSIS WITHOUT MYELOPATHY: Primary | ICD-10-CM

## 2021-10-15 PROCEDURE — 97012 MECHANICAL TRACTION THERAPY: CPT | Performed by: PHYSICAL THERAPIST

## 2021-10-15 PROCEDURE — 97140 MANUAL THERAPY 1/> REGIONS: CPT | Performed by: PHYSICAL THERAPIST

## 2021-10-15 PROCEDURE — 97110 THERAPEUTIC EXERCISES: CPT | Performed by: PHYSICAL THERAPIST

## 2021-10-18 ENCOUNTER — OFFICE VISIT (OUTPATIENT)
Dept: PHYSICAL THERAPY | Facility: MEDICAL CENTER | Age: 81
End: 2021-10-18
Payer: MEDICARE

## 2021-10-18 DIAGNOSIS — M47.812 CERVICAL SPONDYLOSIS WITHOUT MYELOPATHY: Primary | ICD-10-CM

## 2021-10-18 PROCEDURE — 97140 MANUAL THERAPY 1/> REGIONS: CPT | Performed by: PHYSICAL THERAPIST

## 2021-10-18 PROCEDURE — 97012 MECHANICAL TRACTION THERAPY: CPT | Performed by: PHYSICAL THERAPIST

## 2021-10-18 PROCEDURE — 97110 THERAPEUTIC EXERCISES: CPT | Performed by: PHYSICAL THERAPIST

## 2021-10-22 ENCOUNTER — OFFICE VISIT (OUTPATIENT)
Dept: PHYSICAL THERAPY | Facility: MEDICAL CENTER | Age: 81
End: 2021-10-22
Payer: MEDICARE

## 2021-10-22 DIAGNOSIS — M47.812 CERVICAL SPONDYLOSIS WITHOUT MYELOPATHY: Primary | ICD-10-CM

## 2021-10-22 PROCEDURE — 97140 MANUAL THERAPY 1/> REGIONS: CPT | Performed by: PHYSICAL THERAPIST

## 2021-10-22 PROCEDURE — 97012 MECHANICAL TRACTION THERAPY: CPT | Performed by: PHYSICAL THERAPIST

## 2021-10-25 ENCOUNTER — OFFICE VISIT (OUTPATIENT)
Dept: PHYSICAL THERAPY | Facility: MEDICAL CENTER | Age: 81
End: 2021-10-25
Payer: MEDICARE

## 2021-10-25 DIAGNOSIS — M47.812 CERVICAL SPONDYLOSIS WITHOUT MYELOPATHY: Primary | ICD-10-CM

## 2021-10-25 PROCEDURE — 97140 MANUAL THERAPY 1/> REGIONS: CPT | Performed by: PHYSICAL THERAPIST

## 2021-10-25 PROCEDURE — 97012 MECHANICAL TRACTION THERAPY: CPT | Performed by: PHYSICAL THERAPIST

## 2021-10-29 ENCOUNTER — OFFICE VISIT (OUTPATIENT)
Dept: PHYSICAL THERAPY | Facility: MEDICAL CENTER | Age: 81
End: 2021-10-29
Payer: MEDICARE

## 2021-10-29 DIAGNOSIS — M47.812 CERVICAL SPONDYLOSIS WITHOUT MYELOPATHY: Primary | ICD-10-CM

## 2021-10-29 PROCEDURE — 97140 MANUAL THERAPY 1/> REGIONS: CPT | Performed by: PHYSICAL THERAPIST

## 2022-05-25 ENCOUNTER — EVALUATION (OUTPATIENT)
Dept: PHYSICAL THERAPY | Facility: MEDICAL CENTER | Age: 82
End: 2022-05-25
Payer: MEDICARE

## 2022-05-25 DIAGNOSIS — M47.812 CERVICAL SPONDYLOSIS WITHOUT MYELOPATHY: Primary | ICD-10-CM

## 2022-05-25 PROCEDURE — 97161 PT EVAL LOW COMPLEX 20 MIN: CPT | Performed by: PHYSICAL THERAPIST

## 2022-05-25 RX ORDER — ISOSORBIDE DINITRATE 20 MG/1
20 TABLET ORAL 3 TIMES DAILY
COMMUNITY

## 2022-05-25 RX ORDER — ALLOPURINOL 100 MG/1
100 TABLET ORAL DAILY
COMMUNITY

## 2022-05-25 RX ORDER — ROSUVASTATIN CALCIUM 40 MG/1
40 TABLET, COATED ORAL DAILY
COMMUNITY

## 2022-05-25 RX ORDER — TROSPIUM CHLORIDE 20 MG/1
20 TABLET, FILM COATED ORAL 2 TIMES DAILY
COMMUNITY

## 2022-05-25 RX ORDER — FUROSEMIDE 40 MG/1
40 TABLET ORAL 2 TIMES DAILY
COMMUNITY

## 2022-05-25 RX ORDER — SENNA AND DOCUSATE SODIUM 50; 8.6 MG/1; MG/1
1 TABLET, FILM COATED ORAL DAILY
COMMUNITY

## 2022-05-25 RX ORDER — ZOLPIDEM TARTRATE 12.5 MG/1
12.5 TABLET, FILM COATED, EXTENDED RELEASE ORAL
COMMUNITY

## 2022-05-25 RX ORDER — MONTELUKAST SODIUM 10 MG/1
10 TABLET ORAL
COMMUNITY

## 2022-05-25 RX ORDER — PANTOPRAZOLE SODIUM 40 MG/1
40 TABLET, DELAYED RELEASE ORAL DAILY
COMMUNITY

## 2022-05-25 RX ORDER — TRAZODONE HYDROCHLORIDE 50 MG/1
50 TABLET ORAL
COMMUNITY

## 2022-05-25 RX ORDER — DULOXETIN HYDROCHLORIDE 60 MG/1
60 CAPSULE, DELAYED RELEASE ORAL DAILY
COMMUNITY

## 2022-05-25 RX ORDER — ASPIRIN 81 MG/1
81 TABLET, CHEWABLE ORAL DAILY
COMMUNITY

## 2022-05-25 RX ORDER — ALBUTEROL SULFATE 90 UG/1
2 AEROSOL, METERED RESPIRATORY (INHALATION) EVERY 6 HOURS PRN
COMMUNITY

## 2022-05-25 RX ORDER — METOPROLOL SUCCINATE 50 MG/1
50 TABLET, EXTENDED RELEASE ORAL DAILY
COMMUNITY

## 2022-05-25 NOTE — PROGRESS NOTES
PT Evaluation     Today's date: 2022  Patient name: Ellie Medina  : 1940  MRN: [id number]  Referring provider: KELVIN Altamirano  Dx:   Encounter Diagnosis     ICD-10-CM    1  Cervical spondylosis without myelopathy  M47 812                   Assessment  Assessment details: Ellie Medina is a 80 y o  male presents with cervical spondylosis  Ellie Medina has the below listed impairments and will benefit from skilled PT to improve deficits to return to prior level of function  Impairments: abnormal or restricted ROM, impaired physical strength and pain with function  Understanding of Dx/Px/POC: good   Prognosis: good    Goals  Impairment Goals  - Decrease pain to 0-3/10  - Improve ROM equal to [facility]  - Increase strength equal to 5/5    Functional Goals  - Patient will be independent with comprehensive HEP  - Sitting is improved to prior level of function  - Patient will be able to drive without disturbance due to pain  - Patient will be able to lift/carry objects without provocation of symptoms      Plan  Patient would benefit from: skilled PT  Referral necessary: No  Planned therapy interventions: home exercise program, manual therapy, neuromuscular re-education, patient education, functional ROM exercises, strengthening, stretching and joint mobilization  Frequency: 2x week  Duration in weeks: 12  Treatment plan discussed with: patient        Subjective Evaluation    History of Present Illness  Mechanism of injury: Mario presents with complaints of neck pain and intermittent B UE paresthesias  He received cervical epidural injection on 21 and reports he had relief of symptoms for 3 weeks  He has since had a return of symptoms and was referred to PT  CT scan revealed diffuse, very advanced multilevel spondylosis from C3-7  He received additional injections about 1 month ago without improvement  Currently has pain with looking [place], sitting, and driving     Pain  At worst pain ratin    Patient Goals  Patient goals for therapy: decreased pain          Objective     Neurological Testing     Sensation   Cervical/Thoracic   Left   Intact: light touch    Right   Intact: light touch    Reflexes   Left   Biceps (C5/C6): normal (2+)  Brachioradialis (C6): normal (2+)  Triceps (C7): normal (2+)    Right   Biceps (C5/C6): trace (1+)  Brachioradialis (C6): trace (1+)  Triceps (C7): normal (2+)    Active Range of Motion   Cervical/Thoracic Spine       Cervical    Flexion:  WFL  Extension:  with pain Restriction level: moderate  Left lateral flexion:  Restriction level: minimal  Right lateral flexion:  with pain Restriction level moderate  Left rotation:  Restriction level: minimal  Right rotation:  with pain Restriction level: moderate    Joint Play     Hypomobile: C1, C2, C3, C4, C5, C6, C7, T1 and T2     Strength/Myotome Testing     Left Shoulder     Planes of Motion   Abduction: 5     Right Shoulder     Planes of Motion   Abduction: 5     Left Elbow   Flexion: 5  Extension: 5    Right Elbow   Flexion: 5  Extension: 5    Left Wrist/Hand   Wrist flexion: 5    Right Wrist/Hand   Wrist flexion: 5    Tests   Cervical   Negative alar ligament test and Sharp-Judie test               Precautions: pacemaker      Manuals 5/25                                                                Neuro Re-Ed                                                                                                        Ther Ex                                                                                                                     Ther Activity                                       Gait Training                                       Modalities              10'

## 2022-05-25 NOTE — LETTER
May 25, 2022    JULIA Basurto  [address] [address]    Patient: Syed Gutierrez   YOB: 1940   Date of Visit: 2022     Encounter Diagnosis     ICD-10-CM    1  Cervical spondylosis without myelopathy  M47 812        Dear Dr Valeria Wahl: Thank you for your recent referral of Mario Tidwell  Please review the attached evaluation summary from Mario's recent visit  Please verify that you agree with the plan of care by signing the attached order  If you have any questions or concerns, please do not hesitate to call  I sincerely appreciate the opportunity to share in the care of one of your patients and hope to have another opportunity to work with you in the near future  Sincerely,    Renee Rosa, PT      Referring Provider:      I certify that I have read the below Plan of Care and certify the need for these services furnished under this plan of treatment while under my care  JULIA Basurto  [address] [facility]  Via Fax: [phone]          PT Evaluation     Today's date: 2022  Patient name: Syed Gutierrez  : 1940  MRN: [id number]  Referring provider: KELVIN Hoffman  Dx:   Encounter Diagnosis     ICD-10-CM    1  Cervical spondylosis without myelopathy  M47 812                   Assessment  Assessment details: Syed Gutierrez is a 80 y o  male presents with cervical spondylosis  Syed Gutierrez has the below listed impairments and will benefit from skilled PT to improve deficits to return to prior level of function     Impairments: abnormal or restricted ROM, impaired physical strength and pain with function  Understanding of Dx/Px/POC: good   Prognosis: good    Goals  Impairment Goals  - Decrease pain to 0-3/10  - Improve ROM equal to [facility]  - Increase strength equal to 5/5    Functional Goals  - Patient will be independent with comprehensive HEP  - Sitting is improved to prior level of function  - Patient will be able to drive without disturbance due to pain  - Patient will be able to lift/carry objects without provocation of symptoms      Plan  Patient would benefit from: skilled PT  Referral necessary: No  Planned therapy interventions: home exercise program, manual therapy, neuromuscular re-education, patient education, functional ROM exercises, strengthening, stretching and joint mobilization  Frequency: 2x week  Duration in weeks: 12  Treatment plan discussed with: patient        Subjective Evaluation    History of Present Illness  Mechanism of injury: Mario presents with complaints of neck pain and intermittent B UE paresthesias  He received cervical epidural injection on 21 and reports he had relief of symptoms for 3 weeks  He has since had a return of symptoms and was referred to PT  CT scan revealed diffuse, very advanced multilevel spondylosis from C3-7  He received additional injections about 1 month ago without improvement  Currently has pain with looking [place], sitting, and driving     Pain  At worst pain ratin    Patient Goals  Patient goals for therapy: decreased pain          Objective     Neurological Testing     Sensation   Cervical/Thoracic   Left   Intact: light touch    Right   Intact: light touch    Reflexes   Left   Biceps (C5/C6): normal (2+)  Brachioradialis (C6): normal (2+)  Triceps (C7): normal (2+)    Right   Biceps (C5/C6): trace (1+)  Brachioradialis (C6): trace (1+)  Triceps (C7): normal (2+)    Active Range of Motion   Cervical/Thoracic Spine       Cervical    Flexion:  WFL  Extension:  with pain Restriction level: moderate  Left lateral flexion:  Restriction level: minimal  Right lateral flexion:  with pain Restriction level moderate  Left rotation:  Restriction level: minimal  Right rotation:  with pain Restriction level: moderate    Joint Play     Hypomobile: C1, C2, C3, C4, C5, C6, C7, T1 and T2     Strength/Myotome Testing     Left Shoulder     Planes of Motion   Abduction: 5     Right Shoulder     Planes of Motion   Abduction: 5     Left Elbow   Flexion: 5  Extension: 5    Right Elbow   Flexion: 5  Extension: 5    Left Wrist/Hand   Wrist flexion: 5    Right Wrist/Hand   Wrist flexion: 5    Tests   Cervical   Negative alar ligament test and Sharp-Judie test               Precautions: pacemaker      Manuals 5/25                                                                Neuro Re-Ed                                                                                                        Ther Ex                                                                                                                     Ther Activity                                       Gait Training                                       Modalities              10'

## 2022-05-27 ENCOUNTER — OFFICE VISIT (OUTPATIENT)
Dept: PHYSICAL THERAPY | Facility: MEDICAL CENTER | Age: 82
End: 2022-05-27
Payer: MEDICARE

## 2022-05-27 DIAGNOSIS — M47.812 CERVICAL SPONDYLOSIS WITHOUT MYELOPATHY: Primary | ICD-10-CM

## 2022-05-27 PROCEDURE — 97110 THERAPEUTIC EXERCISES: CPT | Performed by: PHYSICAL THERAPIST

## 2022-05-27 PROCEDURE — 97140 MANUAL THERAPY 1/> REGIONS: CPT | Performed by: PHYSICAL THERAPIST

## 2022-05-27 NOTE — PROGRESS NOTES
Daily Note     Today's date: 2022  Patient name: Brianna Messina  : 1940  MRN: [id number]  Referring provider: KELVIN Leavitt  Dx:   Encounter Diagnosis     ICD-10-CM    1  Cervical spondylosis without myelopathy  M47 812                   Subjective: Mario reports his neck was sore after weed wacking yesterday      Objective: See treatment diary below      Assessment: Tolerated treatment well  Patient would benefit from continued PT      Plan: Progress treatment as tolerated         Precautions: pacemaker      Manuals            UPA C1-4  JE Gr II           Cervical distraction  JONE           theragadilia R UT  JE 0330                        Neuro Re-Ed                                                                                                        Ther Ex             Cervical rotation supine  10ea           scap squeeze  10                                                                                         Ther Activity                                       Gait Training                                       Modalities              10' 10'

## 2022-06-01 ENCOUNTER — OFFICE VISIT (OUTPATIENT)
Dept: PHYSICAL THERAPY | Facility: MEDICAL CENTER | Age: 82
End: 2022-06-01
Payer: MEDICARE

## 2022-06-01 DIAGNOSIS — M47.812 CERVICAL SPONDYLOSIS WITHOUT MYELOPATHY: Primary | ICD-10-CM

## 2022-06-01 PROCEDURE — 97140 MANUAL THERAPY 1/> REGIONS: CPT | Performed by: PHYSICAL THERAPIST

## 2022-06-01 NOTE — PROGRESS NOTES
Daily Note     Today's date: 2022  Patient name: Nena Royal  : 1940  MRN: [id number]  Referring provider: KELVIN Blake  Dx:   Encounter Diagnosis     ICD-10-CM    1  Cervical spondylosis without myelopathy  M47 812                   Subjective: Mario reports his neck feels ok, he felt better after PT LV      Objective: See treatment diary below      Assessment: Tolerated treatment well  Patient would benefit from continued PT      Plan: Progress treatment as tolerated         Precautions: pacemaker      Manuals           UPA C1-4  JE Gr II JE Gr II          Cervical distraction  JONE JE          theragun R UT  JE 1750 JE 1750 B                       Neuro Re-Ed                                                                                                        Ther Ex             Cervical rotation supine  10ea 10ea          scap squeeze  10                                                                                         Ther Activity                                       Gait Training                                       Modalities              10' 10' 10'

## 2022-06-03 ENCOUNTER — OFFICE VISIT (OUTPATIENT)
Dept: PHYSICAL THERAPY | Facility: MEDICAL CENTER | Age: 82
End: 2022-06-03
Payer: MEDICARE

## 2022-06-03 DIAGNOSIS — M47.812 CERVICAL SPONDYLOSIS WITHOUT MYELOPATHY: Primary | ICD-10-CM

## 2022-06-03 PROCEDURE — 97140 MANUAL THERAPY 1/> REGIONS: CPT | Performed by: PHYSICAL THERAPIST

## 2022-06-03 NOTE — PROGRESS NOTES
Daily Note     Today's date: 6/3/2022  Patient name: Syed Gutierrez  : 1940  MRN: [id number]  Referring provider: KELVIN Hoffman  Dx:   Encounter Diagnosis     ICD-10-CM    1  Cervical spondylosis without myelopathy  M47 812                   Subjective: Mario reports his neck has been feeling better      Objective: See treatment diary below      Assessment: Tolerated treatment well  Patient exhibited good technique with therapeutic exercises      Plan: Progress treatment as tolerated         Precautions: pacemaker      Manuals 5/25 5/27 6/1 6/3         UPA C1-4  JE Gr II JE Gr II JE Gr II         Cervical distraction  JE JE JE         theragun R UT  JE 1750 JE 1750 B JE 1750 B                      Neuro Re-Ed                                                                                                        Ther Ex             Cervical rotation supine  10ea 10ea 10ea         scap squeeze  10                                                                                         Ther Activity                                       Gait Training                                       Modalities              10' 10' 10' 10'

## 2022-06-08 ENCOUNTER — OFFICE VISIT (OUTPATIENT)
Dept: PHYSICAL THERAPY | Facility: MEDICAL CENTER | Age: 82
End: 2022-06-08
Payer: MEDICARE

## 2022-06-08 DIAGNOSIS — M47.812 CERVICAL SPONDYLOSIS WITHOUT MYELOPATHY: Primary | ICD-10-CM

## 2022-06-08 PROCEDURE — 97140 MANUAL THERAPY 1/> REGIONS: CPT | Performed by: PHYSICAL THERAPIST

## 2022-06-08 NOTE — PROGRESS NOTES
Daily Note     Today's date: 2022  Patient name: Lashae Lara  : 1940  MRN: [id number]  Referring provider: KELVIN Hosuton  Dx:   Encounter Diagnosis     ICD-10-CM    1  Cervical spondylosis without myelopathy  M47 812                   Subjective: Mario reports he used a tool in the garden and didn't have pain  But then he had pain while sitting at dinner last night      Objective: See treatment diary below      Assessment: Tolerated treatment well  Patient exhibited good technique with therapeutic exercises      Plan: Progress treatment as tolerated         Precautions: pacemaker      Manuals 5/25 5/27 6/1 6/3 6/8        UPA C1-4  JE Gr II JE Gr II JE Gr II JE Gr I-II        Cervical distraction  JE JE JE JE        theragun R UT  JE 1750 JE 1750 B JE 1750 B JE 1750                     Neuro Re-Ed                                                                                                        Ther Ex             Cervical rotation supine  10ea 10ea 10ea 10ea        scap squeeze  10                                                                                         Ther Activity                                       Gait Training                                       Modalities              10' 10' 10' 10'

## 2022-06-10 ENCOUNTER — OFFICE VISIT (OUTPATIENT)
Dept: PHYSICAL THERAPY | Facility: MEDICAL CENTER | Age: 82
End: 2022-06-10
Payer: MEDICARE

## 2022-06-10 DIAGNOSIS — M47.812 CERVICAL SPONDYLOSIS WITHOUT MYELOPATHY: Primary | ICD-10-CM

## 2022-06-10 PROCEDURE — 97140 MANUAL THERAPY 1/> REGIONS: CPT | Performed by: PHYSICAL THERAPIST

## 2022-06-10 NOTE — PROGRESS NOTES
Daily Note     Today's date: 6/10/2022  Patient name: Garrison Soto  : 1940  MRN: [id number]  Referring provider: KELVIN Moore  Dx:   Encounter Diagnosis     ICD-10-CM    1  Cervical spondylosis without myelopathy  M47 812                   Subjective: Mario reports his neck feels ok, sitting in 1 position continues to cause him the most pain      Objective: See treatment diary below      Assessment: Tolerated treatment well  Patient would benefit from continued PT      Plan: Progress treatment as tolerated         Precautions: pacemaker      Manuals 5/25 5/27 6/1 6/3 6/8 6/10       UPA C1-4  JE Gr II JE Gr II JE Gr II JE Gr I-II JE Gr I-II       Cervical distraction  JE JE JE JE JE       theragun R UT  JE 1750 JE 1750 B JE 1750 B JE 1750                     Neuro Re-Ed                                                                                                        Ther Ex             Cervical rotation supine  10ea 10ea 10ea 10ea 10ea       scap squeeze  10                                                                                         Ther Activity                                       Gait Training                                       Modalities              10' 10' 10' 10'  10'

## 2022-06-20 ENCOUNTER — OFFICE VISIT (OUTPATIENT)
Dept: PHYSICAL THERAPY | Facility: MEDICAL CENTER | Age: 82
End: 2022-06-20
Payer: MEDICARE

## 2022-06-20 DIAGNOSIS — M47.812 CERVICAL SPONDYLOSIS WITHOUT MYELOPATHY: Primary | ICD-10-CM

## 2022-06-20 PROCEDURE — 97110 THERAPEUTIC EXERCISES: CPT | Performed by: PHYSICAL THERAPIST

## 2022-06-24 ENCOUNTER — OFFICE VISIT (OUTPATIENT)
Dept: PHYSICAL THERAPY | Facility: MEDICAL CENTER | Age: 82
End: 2022-06-24
Payer: MEDICARE

## 2022-06-24 DIAGNOSIS — M47.812 CERVICAL SPONDYLOSIS WITHOUT MYELOPATHY: Primary | ICD-10-CM

## 2022-06-24 PROCEDURE — 97140 MANUAL THERAPY 1/> REGIONS: CPT | Performed by: PHYSICAL THERAPIST

## 2022-06-24 NOTE — PROGRESS NOTES
Daily Note     Today's date: 2022  Patient name: Angely Lazo  : 1940  MRN: [id number]  Referring provider: KELVIN Sanchez  Dx:   Encounter Diagnosis     ICD-10-CM    1  Cervical spondylosis without myelopathy  M47 812                   Subjective: Mario reports he has good days and days when he has pain in his neck  Objective: See treatment diary below      Assessment: Tolerated treatment well  Patient would benefit from continued PT      Plan: Progress treatment as tolerated         Precautions: pacemaker      Manuals 5/25 5/27 6/1 6/3 6/8 6/10 6/20 6/24     UPA C1-4  JE Gr II JE Gr II JE Gr II JE Gr I-II JE Gr I-II JE Gr I-II JE Gr I-II     Cervical distraction  JE JE JE JE JE JE JE     theragun R UT  JE 1750 JE 1750 B JE 1750 B JE 1750 JE 1750 JE 1750 JE 1750                  Neuro Re-Ed                                                                                                        Ther Ex             Cervical rotation supine  10ea 10ea 10ea 10ea 10ea 10ea 10ea     scap squeeze  10                                                                                         Ther Activity                                       Gait Training                                       Modalities              10' 10' 10' 10'  10' 10' 10'

## 2022-06-27 ENCOUNTER — OFFICE VISIT (OUTPATIENT)
Dept: PHYSICAL THERAPY | Facility: MEDICAL CENTER | Age: 82
End: 2022-06-27
Payer: MEDICARE

## 2022-06-27 DIAGNOSIS — M47.812 CERVICAL SPONDYLOSIS WITHOUT MYELOPATHY: Primary | ICD-10-CM

## 2022-06-27 PROCEDURE — 97140 MANUAL THERAPY 1/> REGIONS: CPT | Performed by: PHYSICAL THERAPIST

## 2022-07-01 ENCOUNTER — OFFICE VISIT (OUTPATIENT)
Dept: PHYSICAL THERAPY | Facility: MEDICAL CENTER | Age: 82
End: 2022-07-01
Payer: MEDICARE

## 2022-07-01 DIAGNOSIS — M47.812 CERVICAL SPONDYLOSIS WITHOUT MYELOPATHY: Primary | ICD-10-CM

## 2022-07-01 PROCEDURE — 97140 MANUAL THERAPY 1/> REGIONS: CPT | Performed by: PHYSICAL THERAPIST

## 2022-07-01 NOTE — PROGRESS NOTES
Daily Note     Today's date: 2022  Patient name: Orion Marks  : 1940  MRN: [id number]  Referring provider: KELVIN Middleton  Dx:   Encounter Diagnosis     ICD-10-CM    1  Cervical spondylosis without myelopathy  M47 812                   Subjective: Mario reports he feels a little better compared to [facility] but he is still having to take pain medication at night      Objective: See treatment diary below      Assessment: Tolerated treatment well  Patient exhibited good technique with therapeutic exercises      Plan: Progress treatment as tolerated         Precautions: pacemaker      Manuals 5/25 5/27 6/1 6/3 6/8 6/10 6/20 6/24 6/27 7/1   UPA C1-4  JE Gr II JE Gr II JE Gr II JE Gr I-II JE Gr I-II JE Gr I-II JE Gr I-II JE Gr I JE Gr I   Cervical distraction  JE JE JE JE JE JE JE JE JE   theragun R UT  JE 1750 JE 1750 B JE  B JE  JE  JE  JE  JE  JE                 Neuro Re-Ed                                                                                                        Ther Ex             Cervical rotation supine  10ea 10ea 10ea 10ea 10ea 10ea 10ea     scap squeeze  10                                                                                         Ther Activity                                       Gait Training                                       Modalities              10' 10' 10' 10'  10' 10' 10' 10'

## 2022-07-08 ENCOUNTER — OFFICE VISIT (OUTPATIENT)
Dept: PHYSICAL THERAPY | Facility: MEDICAL CENTER | Age: 82
End: 2022-07-08
Payer: MEDICARE

## 2022-07-08 DIAGNOSIS — M47.812 CERVICAL SPONDYLOSIS WITHOUT MYELOPATHY: Primary | ICD-10-CM

## 2022-07-08 PROCEDURE — 97140 MANUAL THERAPY 1/> REGIONS: CPT | Performed by: PHYSICAL THERAPIST

## 2022-07-08 NOTE — PROGRESS NOTES
Daily Note     Today's date: 2022  Patient name: King Braydon  : 1940  MRN: [id number]  Referring provider: Duane Myers, CR*  Dx:   Encounter Diagnosis     ICD-10-CM    1   Cervical spondylosis without myelopathy  M47 812                   Subjective: Mario reports his neck was sore yesterday but feels better today      Objective: See treatment diary below      Assessment: Tolerated treatment well    Plan: Patient would like to dc to HEP     Precautions: pacemaker      Manuals 7/8 5/27 6/1 6/3 6/8 6/10 6/20 6/24 6/27 7/1   UPA C1-4 JE Gr I JE Gr II JE Gr II JE Gr II JE Gr I-II JE Gr I-II JE Gr I-II JE Gr I-II JE Gr I JE Gr I   Cervical distraction JE JE JE JE JE JE JE JE JE JE   theragun R UT JE 1750 JE 1750 JE 1750 B JE 1750 B JE 1750 JE 1750 JE 1750 JE 1750 JE 1750 JE 1750                Neuro Re-Ed                                                                                                        Ther Ex             Cervical rotation supine  10ea 10ea 10ea 10ea 10ea 10ea 10ea     scap squeeze  10                                                                                         Ther Activity                                       Gait Training                                       Modalities              10' 10' 10' 10'  10' 10' 10' 10'

## 2022-07-15 ENCOUNTER — APPOINTMENT (OUTPATIENT)
Dept: PHYSICAL THERAPY | Facility: MEDICAL CENTER | Age: 82
End: 2022-07-15
Payer: MEDICARE

## 2022-07-18 ENCOUNTER — EVALUATION (OUTPATIENT)
Dept: PHYSICAL THERAPY | Facility: MEDICAL CENTER | Age: 82
End: 2022-07-18
Payer: MEDICARE

## 2022-07-18 DIAGNOSIS — M47.812 CERVICAL SPONDYLOSIS WITHOUT MYELOPATHY: Primary | ICD-10-CM

## 2022-07-18 PROCEDURE — 97140 MANUAL THERAPY 1/> REGIONS: CPT | Performed by: PHYSICAL THERAPIST

## 2022-07-18 NOTE — PROGRESS NOTES
Daily Note     Today's date: 2022  Patient name: Moise Peraza  : 1940  MRN: [id number]  Referring provider: KELVIN Pierce  Dx:   Encounter Diagnosis     ICD-10-CM    1  Cervical spondylosis without myelopathy  M47 812                   Subjective: Mario reports he has been having increase in LBP and numbness in his legs after sitting for 10-15' since he fell a few weeks ago  Denies any subsequent falls  Denies previous numbness in his legs prior to the fall  He reports after he is driving and gets out of his truck he feels off balance for his first few steps  He also reports SOB for the last few days  Objective: Lumbar ROM: 75% lumbar flexion, 25% lumbar ext, 50% SB  SQ pain in all directions  Neg SLR B  Hip ROM WNL  Reflexes: patellar 1+ R, 2+ L, Achilles: 1+ B  Neg clonus  Neg Camp's  Denies bowel/bladder changes/saddle paresthesia  R LE strength: 5/5 throughout except inversion 4/5  L LE strength: hip flex 5/5, Knee ext 5/5, Knee flex 5/5, plantarflexion 5/5, eversion 5/5, inversion 3/5, DF 2/5 (inversion and DF unchanged from previous injury)  Sensation: B LE intact to light touch   MCTSIB: rhomberg firm EO: mild sway 30sec  rhomberg firm EC: mod sway 10 sec  rhomberg airex EO: mod sway 30 sec  rhomberg airex EC: severe sway with LOB 3 sec       Assessment: Tolerated treatment well  Plan: Recommended patient call Dr Jyothi Cedeño to discuss recent onset of LB and paresthesias in B LE  Patient in agreement and will call to return to PT afterwards        Precautions: pacemaker      Manuals 7/8 5/27 6/1 6/3 6/8 6/10 6/20 6/24 6/27 7/1   UPA C1-4 JE Gr I JE Gr II JE Gr II JE Gr II JE Gr I-II JE Gr I-II JE Gr I-II JE Gr I-II JE Gr I JE Gr I   Cervical distraction JE JE JE JE JE JE JE JE JE JE   theragun R UT JE  JE  JE  B JE  B JE  JE  JE  JE  JE  JE                 Neuro Re-Ed Ther Ex             Cervical rotation supine  10ea 10ea 10ea 10ea 10ea 10ea 10ea     scap squeeze  10                                                                                         Ther Activity                                       Gait Training                                       Modalities              10' 10' 10' 10'  10' 10' 10' 10'

## 2022-07-20 ENCOUNTER — APPOINTMENT (OUTPATIENT)
Dept: PHYSICAL THERAPY | Facility: MEDICAL CENTER | Age: 82
End: 2022-07-20
Payer: MEDICARE

## 2022-08-05 ENCOUNTER — EVALUATION (OUTPATIENT)
Dept: PHYSICAL THERAPY | Facility: MEDICAL CENTER | Age: 82
End: 2022-08-05
Payer: MEDICARE

## 2022-08-05 DIAGNOSIS — M47.812 CERVICAL SPONDYLOSIS WITHOUT MYELOPATHY: Primary | ICD-10-CM

## 2022-08-05 PROCEDURE — 97161 PT EVAL LOW COMPLEX 20 MIN: CPT | Performed by: PHYSICAL THERAPIST

## 2022-08-05 PROCEDURE — 97140 MANUAL THERAPY 1/> REGIONS: CPT | Performed by: PHYSICAL THERAPIST

## 2022-08-05 NOTE — PROGRESS NOTES
PT Evaluation     Today's date: 2022  Patient name: Iris Booker  : 1940  MRN: [id number]  Referring provider: Ginger Nichols MD  Dx:   Encounter Diagnosis     ICD-10-CM    1  Cervical spondylosis without myelopathy  M47 812                   Assessment  Assessment details: Iris Booker is a [de-identified] y o  male presents with pain in his cervical and thoracic spine  Iris Booker has the below listed impairments and will benefit from skilled PT to improve deficits to return to prior level of function  Impairments: abnormal or restricted ROM, impaired physical strength and pain with function  Understanding of Dx/Px/POC: good   Prognosis: good    Goals  Impairment Goals  - Decrease pain to 0-4/10  - Improve ROM equal to [facility]  - Increase strength equal to 5/5    Functional Goals  - Patient will be independent with comprehensive HEP  - Sitting duration is improved to prior level of function  - Patient will be able to sleep without disturbance due to pain  - Patient will be able to lift/carry objects without provocation of symptoms      Plan  Patient would benefit from: skilled PT  Referral necessary: No  Planned therapy interventions: home exercise program, manual therapy, neuromuscular re-education, patient education, functional ROM exercises, strengthening, stretching and joint mobilization  Frequency: 2x week  Duration in weeks: 12  Treatment plan discussed with: patient        Subjective Evaluation    History of Present Illness  Mechanism of injury: Mario presents with pain in his neck and intermittent radiating pain into B UE's  He saw Dr Arnie Alvarado on 22 at which time he diagnosed him with spinal stenosis and DDD  He referred him to Dr Zoe Mead for an  epidural injection, which he is scheduled to see him at the end of the month     Pain  At worst pain ratin    Patient Goals  Patient goals for therapy: decreased pain          Objective     Neurological Testing     Sensation   Cervical/Thoracic   Left   Intact: light touch    Right   Intact: light touch    Reflexes   Left   Biceps (C5/C6): trace (1+)  Brachioradialis (C6): trace (1+)  Triceps (C7): trace (1+)    Right   Biceps (C5/C6): trace (1+)  Brachioradialis (C6): trace (1+)  Triceps (C7): trace (1+)    Active Range of Motion   Cervical/Thoracic Spine       Cervical    Flexion:  WFL  Extension:  Restriction level: moderate  Left lateral flexion:  Restriction level: moderate  Right lateral flexion:  Restriction level moderate  Left rotation:  Restriction level: moderate  Right rotation:  Restriction level: moderate    Joint Play     Comments: Hypomobile throughout c-spine    Strength/Myotome Testing     Left Shoulder     Planes of Motion   Flexion: 5   Abduction: 5     Right Shoulder     Planes of Motion   Flexion: 5   Abduction: 5     Left Elbow   Flexion: 5  Extension: 5    Right Elbow   Flexion: 5  Extension: 5    Left Wrist/Hand   Wrist extension: 5  Wrist flexion: 5    Right Wrist/Hand   Wrist extension: 5  Wrist flexion: 5    Tests   Cervical   Negative alar ligament test and Sharp-Judie test               Precautions: none      Manuals 8/5            C3-5 UPA's Gr I-II JE                                                   Neuro Re-Ed                                                                                                        Ther Ex                                                                                                                     Ther Activity                                       Gait Training                                       Modalities              10'

## 2022-08-08 ENCOUNTER — OFFICE VISIT (OUTPATIENT)
Dept: PHYSICAL THERAPY | Facility: MEDICAL CENTER | Age: 82
End: 2022-08-08
Payer: MEDICARE

## 2022-08-08 DIAGNOSIS — M47.812 CERVICAL SPONDYLOSIS WITHOUT MYELOPATHY: Primary | ICD-10-CM

## 2022-08-08 PROCEDURE — 97140 MANUAL THERAPY 1/> REGIONS: CPT | Performed by: PHYSICAL THERAPIST

## 2022-08-12 ENCOUNTER — OFFICE VISIT (OUTPATIENT)
Dept: PHYSICAL THERAPY | Facility: MEDICAL CENTER | Age: 82
End: 2022-08-12
Payer: MEDICARE

## 2022-08-12 DIAGNOSIS — M47.812 CERVICAL SPONDYLOSIS WITHOUT MYELOPATHY: Primary | ICD-10-CM

## 2022-08-12 PROCEDURE — 97140 MANUAL THERAPY 1/> REGIONS: CPT | Performed by: PHYSICAL THERAPIST

## 2022-08-12 NOTE — PROGRESS NOTES
Daily Note     Today's date: 2022  Patient name: Madina Nice  : 1940  MRN: [id number]  Referring provider: Sarai Morin MD  Dx:   Encounter Diagnosis     ICD-10-CM    1  Cervical spondylosis without myelopathy  M47 812                   Subjective: Mario reports his neck has been feeling a little better  He has epidural scheduled for   Objective: See treatment diary below      Assessment: Tolerated treatment well  Patient would benefit from continued PT      Plan: Progress treatment as tolerated         Precautions: none      Manuals           C3-5 UPA's Gr I-II JE Gr II JE Gr II JE          Cervical distraciton  JE JE                                    Neuro Re-Ed                                                                                                        Ther Ex                                                                                                                     Ther Activity                                       Gait Training                                       Modalities              10'            ice  10' 10'

## 2022-08-15 ENCOUNTER — OFFICE VISIT (OUTPATIENT)
Dept: PHYSICAL THERAPY | Facility: MEDICAL CENTER | Age: 82
End: 2022-08-15
Payer: MEDICARE

## 2022-08-15 DIAGNOSIS — M47.812 CERVICAL SPONDYLOSIS WITHOUT MYELOPATHY: Primary | ICD-10-CM

## 2022-08-15 PROCEDURE — 97140 MANUAL THERAPY 1/> REGIONS: CPT | Performed by: PHYSICAL THERAPIST

## 2022-08-15 NOTE — PROGRESS NOTES
Daily Note     Today's date: 8/15/2022  Patient name: Sharon Sexton  : 1940  MRN: [id number]  Referring provider: Yinka Garrett MD  Dx:   Encounter Diagnosis     ICD-10-CM    1  Cervical spondylosis without myelopathy  M47 812                   Subjective: Mario reports his neck was aching more over the weekend      Objective: See treatment diary below      Assessment: Tolerated treatment well  Patient would benefit from continued PT      Plan: Progress treatment as tolerated         Precautions: none      Manuals 8/5 8/8 8/12 8/15         C3-5 UPA's Gr I-II JE Gr II JE Gr II JE Gr II JE         Cervical distraciton  JE JE JE                                   Neuro Re-Ed                                                                                                        Ther Ex                                                                                                                     Ther Activity                                       Gait Training                                       Modalities              10'            ice  10' 10'  10'

## 2022-08-19 ENCOUNTER — OFFICE VISIT (OUTPATIENT)
Dept: PHYSICAL THERAPY | Facility: MEDICAL CENTER | Age: 82
End: 2022-08-19
Payer: MEDICARE

## 2022-08-19 DIAGNOSIS — M47.812 CERVICAL SPONDYLOSIS WITHOUT MYELOPATHY: Primary | ICD-10-CM

## 2022-08-19 PROCEDURE — 97140 MANUAL THERAPY 1/> REGIONS: CPT | Performed by: PHYSICAL THERAPIST

## 2022-08-19 NOTE — PROGRESS NOTES
Daily Note     Today's date: 2022  Patient name: Maame Dee  : 1940  MRN: [id number]  Referring provider: Tara Lara MD  Dx:   Encounter Diagnosis     ICD-10-CM    1  Cervical spondylosis without myelopathy  M47 812                   Subjective: Mario reports his neck feels ok  Epidural scheduled for 2 weeks      Objective: See treatment diary below      Assessment: Tolerated treatment well  Patient would benefit from continued PT      Plan: Progress treatment as tolerated         Precautions: none      Manuals 8/5 8/8 8/12 8/15 8/19        C3-5 UPA's Gr I-II JE Gr II JE Gr II JE Gr II JE Gr II JE        Cervical distraciton  JE JE JE JE                                  Neuro Re-Ed                                                                                                        Ther Ex                                                                                                                     Ther Activity                                       Gait Training                                       Modalities              10'            ice  10' 10  10'

## 2022-08-24 ENCOUNTER — OFFICE VISIT (OUTPATIENT)
Dept: PHYSICAL THERAPY | Facility: MEDICAL CENTER | Age: 82
End: 2022-08-24
Payer: MEDICARE

## 2022-08-24 DIAGNOSIS — M47.812 CERVICAL SPONDYLOSIS WITHOUT MYELOPATHY: Primary | ICD-10-CM

## 2022-08-24 PROCEDURE — 97140 MANUAL THERAPY 1/> REGIONS: CPT | Performed by: PHYSICAL THERAPIST

## 2022-08-24 NOTE — PROGRESS NOTES
Daily Note     Today's date: 2022  Patient name: Sharon Sexton  : 1940  MRN: [id number]  Referring provider: Yinka Garrett MD  Dx:   Encounter Diagnosis     ICD-10-CM    1  Cervical spondylosis without myelopathy  M47 812                   Subjective: Mario reports he does feel improvement overall in his pain  There are times he can sit for hours without pain  Objective: See treatment diary below      Assessment: Tolerated treatment well  Patient would benefit from continued PT      Plan: Progress treatment as tolerated         Precautions: none      Manuals 8/5 8/8 8/12 8/15 8/19 8/24       C3-5 UPA's Gr I-II JE Gr II JE Gr II JE Gr II JE Gr II JE Gr II JE       Cervical distraciton  JE JE JE JE JE                                 Neuro Re-Ed                                                                                                        Ther Ex                                                                                                                     Ther Activity                                       Gait Training                                       Modalities              10'            ice  10' 10'  10 10'

## 2022-08-26 ENCOUNTER — OFFICE VISIT (OUTPATIENT)
Dept: PHYSICAL THERAPY | Facility: MEDICAL CENTER | Age: 82
End: 2022-08-26
Payer: MEDICARE

## 2022-08-26 DIAGNOSIS — M47.812 CERVICAL SPONDYLOSIS WITHOUT MYELOPATHY: Primary | ICD-10-CM

## 2022-08-26 PROCEDURE — 97110 THERAPEUTIC EXERCISES: CPT | Performed by: PHYSICAL THERAPIST

## 2022-08-26 PROCEDURE — 97140 MANUAL THERAPY 1/> REGIONS: CPT | Performed by: PHYSICAL THERAPIST

## 2022-08-26 NOTE — PROGRESS NOTES
Daily Note     Today's date: 2022  Patient name: Moise Peraza  : 1940  MRN: [id number]  Referring provider: Devon Levy MD  Dx:   Encounter Diagnosis     ICD-10-CM    1  Cervical spondylosis without myelopathy  M47 812                   Subjective: Mario reports he is going to his grandson's football game tonight so that will be a good test to see how he feels sitting through a game      Objective: See treatment diary below      Assessment: Tolerated treatment well  Patient would benefit from continued PT      Plan: Progress treatment as tolerated         Precautions: none      Manuals 8/5 8/8 8/12 8/15 8/19 8/24 8/26      C3-5 UPA's Gr I-II JE Gr II JE Gr II JE Gr II JE Gr II JE Gr II JE Gr II JE      Cervical distraciton  JE JE JE JE JE JE                                Neuro Re-Ed                                                                                                        Ther Ex                                                                                                                     Ther Activity                                       Gait Training                                       Modalities              10'            ice  10' 10'  10' 10' 10'

## 2022-08-29 ENCOUNTER — OFFICE VISIT (OUTPATIENT)
Dept: PHYSICAL THERAPY | Facility: MEDICAL CENTER | Age: 82
End: 2022-08-29
Payer: MEDICARE

## 2022-08-29 DIAGNOSIS — M47.812 CERVICAL SPONDYLOSIS WITHOUT MYELOPATHY: Primary | ICD-10-CM

## 2022-08-29 PROCEDURE — 97140 MANUAL THERAPY 1/> REGIONS: CPT | Performed by: PHYSICAL THERAPIST

## 2022-08-29 NOTE — PROGRESS NOTES
Daily Note     Today's date: 2022  Patient name: Cornel Davenport  : 1940  MRN: [id number]  Referring provider: Evi Veliz MD  Dx:   Encounter Diagnosis     ICD-10-CM    1  Cervical spondylosis without myelopathy  M47 812                   Subjective: Mario reports he was able to sit at his grandson's football game on Fri      Objective: See treatment diary below      Assessment: Tolerated treatment well  Patient would benefit from continued PT      Plan: Potential discharge next visit       Precautions: none      Manuals 8/5 8/8 8/12 8/15 8/19 8/24 8/26 8/29     C3-5 UPA's Gr I-II JE Gr II JE Gr II JE Gr II JE Gr II JE Gr II JE Gr II JE Gr II JE     Cervical distraciton  JE JE JE JE JE JE JE                               Neuro Re-Ed                                                                                                        Ther Ex                                                                                                                     Ther Activity                                       Gait Training                                       Modalities              10'            ice  10' 10'  10' 10' 10 10'

## 2022-08-31 ENCOUNTER — OFFICE VISIT (OUTPATIENT)
Dept: PHYSICAL THERAPY | Facility: MEDICAL CENTER | Age: 82
End: 2022-08-31
Payer: MEDICARE

## 2022-08-31 DIAGNOSIS — M47.812 CERVICAL SPONDYLOSIS WITHOUT MYELOPATHY: Primary | ICD-10-CM

## 2022-08-31 PROCEDURE — 97140 MANUAL THERAPY 1/> REGIONS: CPT | Performed by: PHYSICAL THERAPIST

## 2022-08-31 NOTE — PROGRESS NOTES
Daily Note     Today's date: 2022  Patient name: Mickey Session  : 1940  MRN: [id number]  Referring provider: Krystle Landin MD  Dx:   Encounter Diagnosis     ICD-10-CM    1  Cervical spondylosis without myelopathy  M47 812                   Subjective: Mario reports he has epidural scheduled for Fri      Objective: See treatment diary below      Assessment: Tolerated treatment well         Plan: DC to [facility] at this time     Precautions: none      Manuals 8/5 8/8 8/12 8/15 8/19 8/24 8/26 8/29 8/31    C3-5 UPA's Gr I-II JE Gr II JE Gr II JE Gr II JE Gr II JE Gr II JE Gr II JE Gr II JE Gr II JE    Cervical distraciton  JE JE JE JE JE JE JE JE                              Neuro Re-Ed                                                                                                        Ther Ex                                                                                                                     Ther Activity                                       Gait Training                                       Modalities              10'            ice  10' 10'  10' 10' 10 10' 10'

## 2023-07-28 ENCOUNTER — EVALUATION (OUTPATIENT)
Dept: PHYSICAL THERAPY | Facility: MEDICAL CENTER | Age: 83
End: 2023-07-28
Payer: MEDICARE

## 2023-07-28 DIAGNOSIS — M54.2 NECK PAIN: Primary | ICD-10-CM

## 2023-07-28 PROCEDURE — 97140 MANUAL THERAPY 1/> REGIONS: CPT | Performed by: PHYSICAL THERAPIST

## 2023-07-28 PROCEDURE — 97161 PT EVAL LOW COMPLEX 20 MIN: CPT | Performed by: PHYSICAL THERAPIST

## 2023-07-28 NOTE — PROGRESS NOTES
PT Evaluation     Today's date: 2023  Patient name: Amadou Bauer  : 1940  MRN: [id number]  Referring provider: Lisseth Lamb MD  Dx:   Encounter Diagnosis     ICD-10-CM    1. Neck pain  M54.2                      Assessment  Assessment details: Amadou Bauer is a 82 y.o. male presents with chronic Neck pain. Amadou Bauer has the below listed impairments and will benefit from skilled PT to improve deficits to return to prior level of function. Impairments: abnormal muscle firing, abnormal or restricted ROM, impaired physical strength and pain with function  Understanding of Dx/Px/POC: good   Prognosis: good    Goals  Impairment Goals  - Decrease pain to 0-3/10  - Improve ROM equal to [facility]  - Increase strength equal to 5/5    Functional Goals  - Patient will be independent with comprehensive HEP  - Sitting is improved to prior level of function  - Driving is improved to prior level of function  - Gardening is improved to prior level of function  - Patient will be able to lift/carry objects without provocation of symptoms      Plan  Patient would benefit from: skilled PT  Referral necessary: No  Planned therapy interventions: home exercise program, manual therapy, neuromuscular re-education, patient education, functional ROM exercises, strengthening, stretching and joint mobilization  Frequency: 2x week  Duration in weeks: 8  Treatment plan discussed with: patient        Subjective Evaluation    History of Present Illness  Mechanism of injury: Mario presents with chronic neck pain. He reports the pain is worse on the L vs R. He has intermittent numbness in both arms. He has received multiple injections and 1 ablation which provide about 1 month of relief. He is scheduled for another injection at the end of August. He reports he also feels like his legs are weak with activities and he gets tired more easily.    Patient Goals  Patient goals for therapy: decreased pain and increased strength          Objective     Neurological Testing     Reflexes   Left   Biceps (C5/C6): normal (2+)  Brachioradialis (C6): normal (2+)  Triceps (C7): normal (2+)    Right   Biceps (C5/C6): trace (1+)  Brachioradialis (C6): trace (1+)  Triceps (C7): trace (1+)    Active Range of Motion   Cervical/Thoracic Spine       Cervical    Flexion:  WFL  Extension:  Restriction level: moderate  Left lateral flexion:  Restriction level: maximal  Right lateral flexion:  Restriction level maximal  Left rotation:  Restriction level: moderate  Right rotation:  Restriction level: moderate    Joint Play     Hypomobile: C1, C2, C3, C4, C5, C6 and C7     Strength/Myotome Testing     Left Shoulder     Planes of Motion   Flexion: 5   Abduction: 5     Right Shoulder     Planes of Motion   Flexion: 5   Abduction: 5     Left Elbow   Flexion: 5  Extension: 5    Right Elbow   Flexion: 5  Extension: 5    Left Wrist/Hand   Wrist extension: 5  Wrist flexion: 5    Right Wrist/Hand   Wrist extension: 5  Wrist flexion: 5    Left Hip   Planes of Motion   Flexion: 5    Right Hip   Planes of Motion   Flexion: 5    Left Knee   Flexion: 5  Extension: 5    Right Knee   Flexion: 5  Extension: 5    Left Ankle/Foot   Dorsiflexion: 2  Plantar flexion: 5  Inversion: 3  Eversion: 3    Right Ankle/Foot   Dorsiflexion: 5  Plantar flexion: 5  Inversion: 5  Eversion: 5    Additional Strength Details  Weakness in L foot is pre-existing    Tests   Cervical   Negative alar ligament test and Sharp-Judie test.              Precautions: pacemaker      Manuals 7/28            UPA's C1-4 Gr I JE                                                   Neuro Re-Ed                                                                                                        Ther Ex             Core rows             Core ext             robbery             lawnmower             Sit to stand             Step ups             march                          Ther Activity Gait Training                                       Modalities             MH

## 2023-08-02 ENCOUNTER — OFFICE VISIT (OUTPATIENT)
Dept: PHYSICAL THERAPY | Facility: MEDICAL CENTER | Age: 83
End: 2023-08-02
Payer: MEDICARE

## 2023-08-02 DIAGNOSIS — M54.2 NECK PAIN: Primary | ICD-10-CM

## 2023-08-02 PROCEDURE — 97140 MANUAL THERAPY 1/> REGIONS: CPT | Performed by: PHYSICAL THERAPIST

## 2023-08-02 PROCEDURE — 97110 THERAPEUTIC EXERCISES: CPT | Performed by: PHYSICAL THERAPIST

## 2023-08-02 NOTE — PROGRESS NOTES
Daily Note     Today's date: 2023  Patient name: Leopoldo Hatfield  : 1940  MRN: [id number]  Referring provider: Angelita Mason MD  Dx:   Encounter Diagnosis     ICD-10-CM    1. Neck pain  M54.2                      Subjective: Mario reports his neck feels pretty good      Objective: See treatment diary below      Assessment: Tolerated treatment well. Patient exhibited good technique with therapeutic exercises      Plan: Progress treatment as tolerated.        Precautions: pacemaker      Manuals            UPA's C1-4 Gr I JE Gr I-II JE                                                  Neuro Re-Ed                                                                                                        Ther Ex             Core rows             Core ext             robbery             lawnmower             Sit to stand  10           Step ups                            Ther Activity                                       Gait Training                                       Modalities               10'

## 2023-08-04 ENCOUNTER — OFFICE VISIT (OUTPATIENT)
Dept: PHYSICAL THERAPY | Facility: MEDICAL CENTER | Age: 83
End: 2023-08-04
Payer: MEDICARE

## 2023-08-04 DIAGNOSIS — M54.2 NECK PAIN: Primary | ICD-10-CM

## 2023-08-04 PROCEDURE — 97140 MANUAL THERAPY 1/> REGIONS: CPT | Performed by: PHYSICAL THERAPIST

## 2023-08-04 PROCEDURE — 97110 THERAPEUTIC EXERCISES: CPT | Performed by: PHYSICAL THERAPIST

## 2023-08-04 NOTE — PROGRESS NOTES
Daily Note     Today's date: 2023  Patient name: Leopoldo Hatfield  : 1940  MRN: [id number]  Referring provider: Angelita Mason MD  Dx:   Encounter Diagnosis     ICD-10-CM    1. Neck pain  M54.2                      Subjective: Mario reports his neck feels a little looser      Objective: See treatment diary below      Assessment: Tolerated treatment well. Patient exhibited good technique with therapeutic exercises      Plan: Progress treatment as tolerated.        Precautions: pacemaker      Manuals           UPA's C1-4 Gr I JE Gr I-II JE Gr                                                 Neuro Re-Ed                                                                                                        Ther Ex             Core rows             Core ext             robbery             lawnmower             Sit to stand  10 2x10          Step ups  10ea 2x10          march             Lat step up   x10          Ther Activity                                       Gait Training                                       Modalities               10' 10'

## 2023-08-07 ENCOUNTER — OFFICE VISIT (OUTPATIENT)
Dept: PHYSICAL THERAPY | Facility: MEDICAL CENTER | Age: 83
End: 2023-08-07
Payer: MEDICARE

## 2023-08-07 DIAGNOSIS — M54.2 NECK PAIN: Primary | ICD-10-CM

## 2023-08-07 PROCEDURE — 97110 THERAPEUTIC EXERCISES: CPT | Performed by: PHYSICAL THERAPIST

## 2023-08-07 PROCEDURE — 97140 MANUAL THERAPY 1/> REGIONS: CPT | Performed by: PHYSICAL THERAPIST

## 2023-08-07 NOTE — PROGRESS NOTES
Daily Note     Today's date: 2023  Patient name: Gabriela Hutton  : 1940  MRN: [id number]  Referring provider: Stacie Yuan MD  Dx:   Encounter Diagnosis     ICD-10-CM    1. Neck pain  M54.2                      Subjective: Mario reports he mowed the grass for 2-3 hours over the weekend and his neck was ok      Objective: See treatment diary below      Assessment: Tolerated treatment well. Patient exhibited good technique with therapeutic exercises      Plan: Progress treatment as tolerated.        Precautions: pacemaker      Manuals          UPA's C1-4 Gr I JE Gr I-II JE Gr Gr I-II JE                                                Neuro Re-Ed                                                                                                        Ther Ex             Core rows             Core ext             robbery             lawnmower             Sit to stand  10 2x10 2x10         Step ups  10ea 2x10 2x10         march    x10         Lat step up   x10 2x10         Stand hip abd    x10         Stand HS curl    x10         Ther Activity                                       Gait Training                                       Modalities               10' 10' 10'

## 2023-08-11 ENCOUNTER — OFFICE VISIT (OUTPATIENT)
Dept: PHYSICAL THERAPY | Facility: MEDICAL CENTER | Age: 83
End: 2023-08-11
Payer: MEDICARE

## 2023-08-11 DIAGNOSIS — M54.2 NECK PAIN: Primary | ICD-10-CM

## 2023-08-11 PROCEDURE — 97140 MANUAL THERAPY 1/> REGIONS: CPT | Performed by: PHYSICAL THERAPIST

## 2023-08-11 NOTE — PROGRESS NOTES
Daily Note     Today's date: 2023  Patient name: Jasper Corey  : 1940  MRN: [id number]  Referring provider: Albesa Bosworth, MD  Dx:   Encounter Diagnosis     ICD-10-CM    1. Neck pain  M54.2                      Subjective: Mario reports his neck and hip are sore from pulling weeds      Objective: See treatment diary below      Assessment: Tolerated treatment well. Patient deferred strength exercises 2* hip pain      Plan: Progress treatment as tolerated.        Precautions: pacemaker      Manuals         UPA's C1-4 Gr I JE Gr I-II JE Gr Gr I-II JE Gr I -II JE        STM     JE                                  Neuro Re-Ed                                                                                                        Ther Ex             Core rows             Core ext             robbery             lawnmower             Sit to stand  10 2x10 2x10         Step ups  10ea 2x10 2x10         march    x10         Lat step up   x10 2x10         Stand hip abd    x10         Stand HS curl    x10         Ther Activity                                       Gait Training                                       Modalities               10' 10' 10 10'

## 2023-08-14 ENCOUNTER — OFFICE VISIT (OUTPATIENT)
Dept: PHYSICAL THERAPY | Facility: MEDICAL CENTER | Age: 83
End: 2023-08-14
Payer: MEDICARE

## 2023-08-14 DIAGNOSIS — M54.2 NECK PAIN: Primary | ICD-10-CM

## 2023-08-14 PROCEDURE — 97140 MANUAL THERAPY 1/> REGIONS: CPT | Performed by: PHYSICAL THERAPIST

## 2023-08-14 NOTE — PROGRESS NOTES
Daily Note     Today's date: 2023  Patient name: Jeremiah Mcadams  : 1940  MRN: [id number]  Referring provider: Sheyla Liang MD  Dx:   Encounter Diagnosis     ICD-10-CM    1. Neck pain  M54.2                      Subjective: Mario reports his neck still hurts from pulling weeds last week      Objective: See treatment diary below      Assessment: Tolerated treatment well. Patient exhibited good technique with therapeutic exercises      Plan: Progress treatment as tolerated.        Precautions: pacemaker      Manuals        UPA's C1-4 Gr I JE Gr I-II JE Gr Gr I-II JE Gr I -II JE Gr I-II JE       STM     JE JE                                 Neuro Re-Ed                                                                                                        Ther Ex             Core rows             Core ext             robbery             lawnmower             Sit to stand  10 2x10 2x10         Step ups  10ea 2x10 2x10         march    x10         Lat step up   x10 2x10         Stand hip abd    x10         Stand HS curl    x10         Ther Activity                                       Gait Training                                       Modalities               10' 10' 10' 10'        ice      10'

## 2023-08-16 ENCOUNTER — APPOINTMENT (OUTPATIENT)
Dept: PHYSICAL THERAPY | Facility: MEDICAL CENTER | Age: 83
End: 2023-08-16
Payer: MEDICARE

## 2023-08-23 ENCOUNTER — APPOINTMENT (OUTPATIENT)
Dept: PHYSICAL THERAPY | Facility: MEDICAL CENTER | Age: 83
End: 2023-08-23
Payer: MEDICARE

## 2023-08-25 ENCOUNTER — APPOINTMENT (OUTPATIENT)
Dept: PHYSICAL THERAPY | Facility: MEDICAL CENTER | Age: 83
End: 2023-08-25
Payer: MEDICARE

## 2023-08-28 ENCOUNTER — APPOINTMENT (OUTPATIENT)
Dept: PHYSICAL THERAPY | Facility: MEDICAL CENTER | Age: 83
End: 2023-08-28
Payer: MEDICARE

## 2023-08-30 ENCOUNTER — APPOINTMENT (OUTPATIENT)
Dept: PHYSICAL THERAPY | Facility: MEDICAL CENTER | Age: 83
End: 2023-08-30
Payer: MEDICARE

## 2025-06-12 ENCOUNTER — EVALUATION (OUTPATIENT)
Facility: REHABILITATION | Age: 85
End: 2025-06-12
Payer: MEDICARE

## 2025-06-12 DIAGNOSIS — R26.2 AMBULATORY DYSFUNCTION: Primary | ICD-10-CM

## 2025-06-12 DIAGNOSIS — Z74.09 IMPAIRED MOBILITY: ICD-10-CM

## 2025-06-12 PROCEDURE — 97162 PT EVAL MOD COMPLEX 30 MIN: CPT | Performed by: PHYSICAL THERAPIST

## 2025-06-12 PROCEDURE — 97110 THERAPEUTIC EXERCISES: CPT | Performed by: PHYSICAL THERAPIST

## 2025-06-12 NOTE — LETTER
2025    Sebastien Parker DO  [address]  [address]  [place] [address]    Patient: Mario Tidwell   YOB: 1940   Date of Visit: 2025     Encounter Diagnosis     ICD-10-CM    1. Ambulatory dysfunction  R26.2       2. Impaired mobility  Z74.09           Dear Dr. Sebastien Parker DO:    Thank you for your recent referral of Mario Tidwell. Please review the attached evaluation summary from Mario's recent visit.     Please verify that you agree with the plan of care by signing the attached order.     If you have any questions or concerns, please do not hesitate to call.     I sincerely appreciate the opportunity to share in the care of one of your patients and hope to have another opportunity to work with you in the near future.       Sincerely,    Jhonny Carrington, PT      Referring Provider:      I certify that I have read the below Plan of Care and certify the need for these services furnished under this plan of treatment while under my care.                    Sebastien Parker DO  [address]  [address]  [place] [address]  Via Fax: [phone]          PHYSICAL THERAPY EVALUATION     Date: 25  Name: Mario Tidwell  : 1940  Referring Provider: Sebastien Parker DO  AUTHORIZATION:   Insurance: Payor: MEDICARE / Plan: MEDICARE A AND B / Product Type: Medicare A & B Fee for Service /     SUBJECTIVE:  HPI: Mario Tidwell is a 84 y.o. male referred to outpatient physical therapy for the following diagnosis   Encounter Diagnosis   Name Primary?    Impaired mobility Yes          Patient reports he's had heart failure on numerous occasions.  Most recently in 2025, unable to sleep, very low energy.  Developed cellulitis in his legs and hands.  Had swelling and cuts in hands and feet.  He's had a fall.  Wears a brace for left dropfoot.  Wasn't wearing a brace.  Within a month, fell 4 times.  And 3 other times fell, fell backwards.  Once fell out of chair, once in  "standing.  He's lost 80 lbs over the past year, no appetite. Currently around 175-176 lbs.  Had dropped as low as 145 lbs, doctors want him around 170-171 lbs.  He's trying to build himself back.  Now has a normal appetite.    Started taking a small dose of altrasam, helped with sleeping.  Uses cane most of the time, or uses rollator walker too at home or if walking distances.      No current exercise program.  [facility]  Was [facility].    Hasn't done much exercise without supervision.       No change in vision, no dizziness.  Wears glasses for reading only.    Patient-Specific Functional Scale   Task is scored 0 (unable to perform activity) to 10 (ability to perform activity independently)  Activity     Improve strength and balance and ability.      2.        3.          Past Medical History[1]  Current Medications[2]    OBJECTIVE:   Vitals     Blood pressure (resting, left arm unless noted) 118/52    Heart rate (resting) 63 bpm , 99% SpO2  has defibrillator and pacemaker       Oculomotor & Coordination     Smooth pursuit & conjugate gaze Normal except mildly saccadic smooth pursuit    Fingertip to nose & rapidly alternating hand movements Normal     Had back surgery followed by left drop foot.  Wearing left ankle brace. Surgery was in 2015.  Wasn't wearing brace but now uses it due to falls.  Falls were tripping over the left foot.  4+/5 shoulder abduction bilaterally  5/5 elbow flexion bilaterally  5/5 wrist extension bilaterally  4/5 hip flexion bilaterally  4/5 knee extension bilaterally  4-/5 right dorsiflexion, 2-/5 left wearing left ankle brace     Mobility Measures 6/12/25   5 Time Sit to Stand  (17\" chair, arms across chest) Unable from 18\" surface    From 20\" surface completes 2-3 in about 12 seconds   3 Meter Timed  Up & Go 12.9 seconds  With cane   Walking speed (self-selected)    Functional Gait Assessment (see below) [id number]    8/30 "   2 Minute Walk Test (100 foot circular course) 2 minute walk test   With cane in solostep    206 feet    Ending heart rate 77 bpm  98% SpO2   Patient-Reported Outcome Measure: Activities-Specific Balance Confidence Scale (ABC Scale) 32%       ASSESSMENT:  ***    Further referral needed: {YES/NO:07668}    SHORT-TERM GOALS: by ***  1. Patient ***  2. Patient ***  3. Patient ***    LONG-TERM GOALS: by ***   1. Patient ***  2. Patient ***    Precautions ***    Specialty Treatment Diary  ***     Home exercise program ***     (Ther-ex, neuromuscular re-ed)Walking/endurance      (Neuromuscular re-ed) Static and dynamic balance/anticipatory / reactive      (Ther-ex) Strengthening      (There-ex) Stretching              PLAN OF CARE:  Patient will benefit from physical therapy *** times per week for *** months  Neuromuscular re-education, therapeutic exercises, and therapeutic activities as outlined in grids.    Jhonny Carrington, PT  6/12/2025         [1]   Past Medical History:  Diagnosis Date    Acute on chronic systolic heart failure (HCC)     Acute renal failure (ARF) (HCC)     Anxiety     Atrial fibrillation (HCC)     Cardiomyopathy (HCC)     Cataracts, bilateral     Chronic kidney disease     COPD (chronic obstructive pulmonary disease) (HCC)     Coronary artery disease     Depression     Diabetes mellitus (HCC)     Hypertension     Obesity     Osteopenia    [2]   Current Outpatient Medications:     albuterol (PROVENTIL HFA,VENTOLIN HFA) 90 mcg/act inhaler, Inhale 2 puffs every 6 (six) hours as needed for wheezing, Disp: , Rfl:     allopurinol (ZYLOPRIM) 100 mg tablet, Take 100 mg by mouth daily, Disp: , Rfl:     aspirin 81 mg chewable tablet, Chew 81 mg daily, Disp: , Rfl:     atorvastatin (LIPITOR) 40 mg tablet, Take 40 mg by mouth daily, Disp: , Rfl:     DULoxetine (CYMBALTA) 60 mg delayed release capsule, Take 60 mg by mouth daily, Disp: , Rfl:     furosemide (LASIX) 40 mg tablet, Take 40 mg by mouth 2 (two)  times a day, Disp: , Rfl:     isosorbide dinitrate (ISORDIL) 20 mg tablet, Take 20 mg by mouth 3 (three) times a day, Disp: , Rfl:     methocarbamol (ROBAXIN) 500 mg tablet, Take 500 mg by mouth 4 (four) times a day, Disp: , Rfl:     metoprolol succinate (TOPROL-XL) 50 mg 24 hr tablet, Take 50 mg by mouth daily, Disp: , Rfl:     montelukast (SINGULAIR) 10 mg tablet, Take 10 mg by mouth daily at bedtime, Disp: , Rfl:     pantoprazole (PROTONIX) 40 mg tablet, Take 40 mg by mouth daily, Disp: , Rfl:     rosuvastatin (CRESTOR) 40 MG tablet, Take 40 mg by mouth daily, Disp: , Rfl:     senna-docusate sodium (SENOKOT-S) 8.6-50 mg per tablet, Take 1 tablet by mouth daily, Disp: , Rfl:     traZODone (DESYREL) 50 mg tablet, Take 50 mg by mouth daily at bedtime, Disp: , Rfl:     trospium chloride (SANCTURA) 20 mg tablet, Take 20 mg by mouth 2 (two) times a day, Disp: , Rfl:     zolpidem (AMBIEN CR) 12.5 MG CR tablet, Take 12.5 mg by mouth daily at bedtime as needed for sleep, Disp: , Rfl:                           [1]  Past Medical History:  Diagnosis Date    Acute on chronic systolic heart failure (HCC)     Acute renal failure (ARF) ([facility])     Anxiety     Atrial fibrillation (HCC)     Cardiomyopathy (HCC)     Cataracts, bilateral     Chronic kidney disease     COPD (chronic obstructive pulmonary disease) (HCC)     Coronary artery disease     Depression     Diabetes mellitus (HCC)     Hypertension     Obesity     Osteopenia    [2]    Current Outpatient Medications:     albuterol (PROVENTIL HFA,VENTOLIN HFA) 90 mcg/act inhaler, Inhale 2 puffs every 6 (six) hours as needed for wheezing, Disp: , Rfl:     allopurinol (ZYLOPRIM) 100 mg tablet, Take 100 mg by mouth daily, Disp: , Rfl:     aspirin 81 mg chewable tablet, Chew 81 mg daily, Disp: , Rfl:     atorvastatin (LIPITOR) 40 mg tablet, Take 40 mg by mouth daily, Disp: , Rfl:     DULoxetine (CYMBALTA) 60 mg delayed release capsule, Take 60 mg by mouth daily, Disp: , Rfl:      furosemide (LASIX) 40 mg tablet, Take 40 mg by mouth 2 (two) times a day, Disp: , Rfl:     isosorbide dinitrate (ISORDIL) 20 mg tablet, Take 20 mg by mouth 3 (three) times a day, Disp: , Rfl:     methocarbamol (ROBAXIN) 500 mg tablet, Take 500 mg by mouth 4 (four) times a day, Disp: , Rfl:     metoprolol succinate (TOPROL-XL) 50 mg 24 hr tablet, Take 50 mg by mouth daily, Disp: , Rfl:     montelukast (SINGULAIR) 10 mg tablet, Take 10 mg by mouth daily at bedtime, Disp: , Rfl:     pantoprazole (PROTONIX) 40 mg tablet, Take 40 mg by mouth daily, Disp: , Rfl:     rosuvastatin (CRESTOR) 40 MG tablet, Take 40 mg by mouth daily, Disp: , Rfl:     senna-docusate sodium (SENOKOT-S) 8.6-50 mg per tablet, Take 1 tablet by mouth daily, Disp: , Rfl:     traZODone (DESYREL) 50 mg tablet, Take 50 mg by mouth daily at bedtime, Disp: , Rfl:     trospium chloride (SANCTURA) 20 mg tablet, Take 20 mg by mouth 2 (two) times a day, Disp: , Rfl:     zolpidem (AMBIEN CR) 12.5 MG CR tablet, Take 12.5 mg by mouth daily at bedtime as needed for sleep, Disp: , Rfl:

## 2025-06-12 NOTE — PROGRESS NOTES
PHYSICAL THERAPY EVALUATION     Date: 25  Name: Mario Tidwell  : 1940  Referring Provider: Sebastien Parker DO  AUTHORIZATION:   Insurance: Payor: MEDICARE / Plan: MEDICARE A AND B / Product Type: Medicare A & B Fee for Service /     SUBJECTIVE:  HPI: Mario Tidwell is a 84 y.o. male referred to outpatient physical therapy for the following diagnosis   Encounter Diagnosis   Name Primary?    Impaired mobility Yes          Patient reports he's had heart failure on numerous occasions.  Most recently in 2025, unable to sleep, very low energy.  Developed cellulitis in his legs and hands.  Had swelling and cuts in hands and feet.  He's had a fall.  Wears a brace for left dropfoot.  Wasn't wearing a brace.  Within a month, fell 4 times.  And 3 other times fell, fell backwards.  Once fell out of chair, once in standing.  He's lost 80 lbs over the past year, no appetite. Currently around 175-176 lbs.  Had dropped as low as 145 lbs, doctors want him around 170-171 lbs.  He's trying to build himself back.  Now has a normal appetite.    Started taking a small dose of altrasam, helped with sleeping.  Uses cane most of the time, or uses rollator walker too at home or if walking distances.      No current exercise program.  [facility] then [facility].  Was cardiac rehabilitation.    Hasn't done much exercise without supervision.       No change in vision, no dizziness.  Wears glasses for reading only.    Patient-Specific Functional Scale   Task is scored 0 (unable to perform activity) to 10 (ability to perform activity independently)  Activity     Improve strength and balance and ability.      2.        3.          Past Medical History[1]  Current Medications[2]    OBJECTIVE:   Vitals     Blood pressure (resting, left arm unless noted) 118/52    Heart rate (resting) 63 bpm , 99% SpO2  has defibrillator and pacemaker       Oculomotor & Coordination     Smooth pursuit & conjugate gaze  "Normal except mildly saccadic smooth pursuit    Fingertip to nose & rapidly alternating hand movements Normal     Had back surgery followed by left drop foot.  Wearing left ankle brace. Surgery was in 2015.  Wasn't wearing brace but now uses it due to falls.  Falls were tripping over the left foot.  4+/5 shoulder abduction bilaterally  5/5 elbow flexion bilaterally  5/5 wrist extension bilaterally  4/5 hip flexion bilaterally  4/5 knee extension bilaterally  4-/5 right dorsiflexion, 2-/5 left wearing left ankle brace     Mobility Measures 6/12/25   5 Time Sit to Stand  (17\" chair, arms across chest) Unable from 18\" surface    From 20\" surface completes 2-3 in about 12 seconds   3 Meter Timed  Up & Go 12.9 seconds  With cane   Walking speed (self-selected)    Functional Gait Assessment (see below) [id number]  [id number]    8/30   2 Minute Walk Test (100 foot circular course) 2 minute walk test   With cane in solostep    206 feet    Ending heart rate 77 bpm  98% SpO2   Patient-Reported Outcome Measure: Activities-Specific Balance Confidence Scale (ABC Scale) 32%       ASSESSMENT:  Mario is a 84 year old male with mobility limitations and history of multiple falls.  Performance on all mobility measures suggests increased risk of fall.  Endurance is limited and affects ability to perform repeat strengthening exercises as well as balance exercises.  Recommend physical therapy to improve mobility and minimize relative risk of fall.    Further referral needed: No    SHORT-TERM GOALS: by 7/11/25  1. Patient stands from 18\" surface without use of hands.  2. Patient walks at least 400 feet during 6 minute walk test.  3. Patient reports decreased incidence of falls/loss of balance.    LONG-TERM GOALS: by 9/12/25   1. Patient walks community distances with assistive device as needed.  2. Patient scores at least 15/30 on Functional Gait Assessment.    Precautions - pacemaker    Specialty Treatment Diary  6/12/25     Home exercise program " Walk for exercise within the home with AD, 2 min x 3-4 times    Sit to stand 5 x 4 sets     (Ther-ex, neuromuscular re-ed)Walking/endurance      (Neuromuscular re-ed) Static and dynamic balance/anticipatory / reactive      (Ther-ex) Strengthening      (There-ex) Stretching              PLAN OF CARE:  Patient will benefit from physical therapy 2-3 times per week for 2 months  Neuromuscular re-education, therapeutic exercises, and therapeutic activities as outlined in grids.    Jhonny Carrington, PT  6/12/2025         [1]   Past Medical History:  Diagnosis Date    Acute on chronic systolic heart failure (HCC)     Acute renal failure (ARF) (HCC)     Anxiety     Atrial fibrillation (HCC)     Cardiomyopathy (HCC)     Cataracts, bilateral     Chronic kidney disease     COPD (chronic obstructive pulmonary disease) (HCC)     Coronary artery disease     Depression     Diabetes mellitus (HCC)     Hypertension     Obesity     Osteopenia    [2]   Current Outpatient Medications:     albuterol (PROVENTIL HFA,VENTOLIN HFA) 90 mcg/act inhaler, Inhale 2 puffs every 6 (six) hours as needed for wheezing, Disp: , Rfl:     allopurinol (ZYLOPRIM) 100 mg tablet, Take 100 mg by mouth daily, Disp: , Rfl:     aspirin 81 mg chewable tablet, Chew 81 mg daily, Disp: , Rfl:     atorvastatin (LIPITOR) 40 mg tablet, Take 40 mg by mouth daily, Disp: , Rfl:     DULoxetine (CYMBALTA) 60 mg delayed release capsule, Take 60 mg by mouth daily, Disp: , Rfl:     furosemide (LASIX) 40 mg tablet, Take 40 mg by mouth 2 (two) times a day, Disp: , Rfl:     isosorbide dinitrate (ISORDIL) 20 mg tablet, Take 20 mg by mouth 3 (three) times a day, Disp: , Rfl:     methocarbamol (ROBAXIN) 500 mg tablet, Take 500 mg by mouth 4 (four) times a day, Disp: , Rfl:     metoprolol succinate (TOPROL-XL) 50 mg 24 hr tablet, Take 50 mg by mouth daily, Disp: , Rfl:     montelukast (SINGULAIR) 10 mg tablet, Take 10 mg by mouth daily at bedtime, Disp: , Rfl:     pantoprazole  (PROTONIX) 40 mg tablet, Take 40 mg by mouth daily, Disp: , Rfl:     rosuvastatin (CRESTOR) 40 MG tablet, Take 40 mg by mouth daily, Disp: , Rfl:     senna-docusate sodium (SENOKOT-S) 8.6-50 mg per tablet, Take 1 tablet by mouth daily, Disp: , Rfl:     traZODone (DESYREL) 50 mg tablet, Take 50 mg by mouth daily at bedtime, Disp: , Rfl:     trospium chloride (SANCTURA) 20 mg tablet, Take 20 mg by mouth 2 (two) times a day, Disp: , Rfl:     zolpidem (AMBIEN CR) 12.5 MG CR tablet, Take 12.5 mg by mouth daily at bedtime as needed for sleep, Disp: , Rfl:

## 2025-06-16 ENCOUNTER — OFFICE VISIT (OUTPATIENT)
Facility: REHABILITATION | Age: 85
End: 2025-06-16
Attending: INTERNAL MEDICINE
Payer: MEDICARE

## 2025-06-16 DIAGNOSIS — Z74.09 IMPAIRED MOBILITY: ICD-10-CM

## 2025-06-16 DIAGNOSIS — R26.2 AMBULATORY DYSFUNCTION: Primary | ICD-10-CM

## 2025-06-16 PROCEDURE — 97112 NEUROMUSCULAR REEDUCATION: CPT | Performed by: PHYSICAL THERAPIST

## 2025-06-16 NOTE — PROGRESS NOTES
"PHYSICAL THERAPY TREATMENT     Date: 25  Name: Mario Tidwell  : 1940  Referring Provider: Sebastien Parker DO  AUTHORIZATION:   Insurance: Payor: MEDICARE / Plan: MEDICARE A AND B / Product Type: Medicare A & B Fee for Service /     SUBJECTIVE:  HPI: Mario Tidwell is a 84 y.o. male referred to outpatient physical therapy for the following diagnosis   Encounter Diagnoses   Name Primary?    Ambulatory dysfunction Yes    Impaired mobility           Patient-Specific Functional Scale   Task is scored 0 (unable to perform activity) to 10 (ability to perform activity independently)  Activity     Improve strength and balance and ability.      2.        3.            OBJECTIVE:      Precautions - pacemaker    Specialty Treatment Diary  25     Home exercise program Sit to stand 20\" surface  5 x 3 sets Walk for exercise within the home with AD, 2 min x 3-4 times    Sit to stand 5 x 4 sets     (Ther-ex, neuromuscular re-ed)Walking/endurance Treadmill 0.7 mph 2 min  0.7 mph 2 min  0.8 mph 2 min      (Neuromuscular re-ed) Static and dynamic balance/anticipatory / reactive Blazepods in solostep  1 min x 2 sets  10' line    Blazepods in solostep  1 min x 2 sets  4\" steps  1 min x 2 sets    Standing heel raise  10 bilaterally  10 each side (L assisted by R)    Walk 50 feet resisted with 20 lbs, timed with feedback, best around 21 sec  X 4 sets              (Ther-ex) Strengthening       (There-ex) Stretching                   Mobility Measures 25   5 Time Sit to Stand  (17\" chair, arms across chest) Unable from 18\" surface    From 20\" surface completes 2-3 in about 12 seconds   3 Meter Timed  Up & Go 12.9 seconds  With cane   Walking speed (self-selected)    Functional Gait Assessment (see below) [id number]  [id number]       2 Minute Walk Test (100 foot circular course) 2 minute walk test   With cane in solostep    206 feet    Ending heart rate 77 bpm  98% SpO2   Patient-Reported Outcome Measure: " "Activities-Specific Balance Confidence Scale (ABC Scale) 32%       ASSESSMENT:  Mario is a 84 year old male with mobility limitations and history of multiple falls.    Completed initial treatment. Frequent rest breaks needed.  Reassess 2 minute walk test intermittently as well as 5TSTS.  Added heel raise to home program.        SHORT-TERM GOALS: by 7/11/25  1. Patient stands from 18\" surface without use of hands.  2. Patient walks at least 400 feet during 6 minute walk test.  3. Patient reports decreased incidence of falls/loss of balance.    LONG-TERM GOALS: by 9/12/25   1. Patient walks community distances with assistive device as needed.  2. Patient scores at least 15/30 on Functional Gait Assessment.    PLAN OF CARE:  Patient will benefit from physical therapy 2-3 times per week for 2 months  Neuromuscular re-education, therapeutic exercises, and therapeutic activities as outlined in grids.    Jhonny Carrington, PT  6/16/2025    "

## 2025-06-18 ENCOUNTER — OFFICE VISIT (OUTPATIENT)
Facility: REHABILITATION | Age: 85
End: 2025-06-18
Attending: INTERNAL MEDICINE
Payer: MEDICARE

## 2025-06-18 DIAGNOSIS — R26.2 AMBULATORY DYSFUNCTION: Primary | ICD-10-CM

## 2025-06-18 DIAGNOSIS — Z74.09 IMPAIRED MOBILITY: ICD-10-CM

## 2025-06-18 PROCEDURE — 97110 THERAPEUTIC EXERCISES: CPT | Performed by: PHYSICAL THERAPIST

## 2025-06-18 PROCEDURE — 97112 NEUROMUSCULAR REEDUCATION: CPT | Performed by: PHYSICAL THERAPIST

## 2025-06-18 NOTE — PROGRESS NOTES
"PHYSICAL THERAPY TREATMENT     Date: 25  Name: Mario Tidwell  : 1940  Referring Provider: Sebastien Parker DO  AUTHORIZATION:   Insurance: Payor: MEDICARE / Plan: MEDICARE A AND B / Product Type: Medicare A & B Fee for Service /     SUBJECTIVE:  HPI: Mario Tidwell is a 84 y.o. male referred to outpatient physical therapy for the following diagnosis   Encounter Diagnoses   Name Primary?    Ambulatory dysfunction Yes    Impaired mobility        Subjective: I have not done my sit to stands yet at home    OBJECTIVE:      Precautions - pacemaker    Specialty Treatment Diary  25     Home exercise program Reviewed HEP encouraged Sit to stand and timed ambulation to facilitate rehab progress Sit to stand 20\" surface  5 x 3 sets Walk for exercise within the home with AD, 2 min x 3-4 times    Sit to stand 5 x 4 sets     (Ther-ex, neuromuscular re-ed)Walking/endurance Treadmill .9 mph x3 min  Standing break  .8mph x 3 min 72 HR Treadmill 0.7 mph 2 min  0.7 mph 2 min  0.8 mph 2 min      (Neuromuscular re-ed) Static and dynamic balance/anticipatory / reactive Blazepods in solostep  4 count random  12' line  1:00 x 2   6, 9    Added 4\" step  1:00x 2 Blazepods in solostep  1 min x 2 sets  10' line    Blazepods in solostep  1 min x 2 sets  4\" steps  1 min x 2 sets    Standing heel raise  10 bilaterally  10 each side (L assisted by R)    Walk 50 feet resisted with 20 lbs, timed with feedback, best around 21 sec  X 4 sets              (Ther-ex) Strengthening        (There-ex) Stretching                  ASSESSMENT:  Increased treadmill duration to three minutes x 3 sets. Resting periods required.  Encouraged Hep compliance to facilitate progressiong of his rehabilitation process.       PLAN OF CARE:Reassess 2 minute walk test intermittently as well as 5TSTS  Patient will benefit from physical therapy 2-3 times per week for 2 months  Neuromuscular re-education, therapeutic exercises, and therapeutic " activities as outlined in grids.    Melly Shabazz, PT  6/18/2025

## 2025-06-20 ENCOUNTER — OFFICE VISIT (OUTPATIENT)
Facility: REHABILITATION | Age: 85
End: 2025-06-20
Attending: INTERNAL MEDICINE
Payer: MEDICARE

## 2025-06-20 DIAGNOSIS — R26.2 AMBULATORY DYSFUNCTION: Primary | ICD-10-CM

## 2025-06-20 DIAGNOSIS — Z74.09 IMPAIRED MOBILITY: ICD-10-CM

## 2025-06-20 PROCEDURE — 97110 THERAPEUTIC EXERCISES: CPT | Performed by: PHYSICAL THERAPIST

## 2025-06-20 PROCEDURE — 97112 NEUROMUSCULAR REEDUCATION: CPT | Performed by: PHYSICAL THERAPIST

## 2025-06-20 NOTE — PROGRESS NOTES
"PHYSICAL THERAPY TREATMENT         Date: 25  Name: Mario Tidwell  : 1940  Referring Provider: Sebastien Parker DO  AUTHORIZATION:   Insurance: Payor: MEDICARE / Plan: MEDICARE A AND B / Product Type: Medicare A & B Fee for Service /     SUBJECTIVE:  HPI: Mario Tidwell is a 84 y.o. male referred to outpatient physical therapy for the following diagnosis   Encounter Diagnoses   Name Primary?    Ambulatory dysfunction Yes    Impaired mobility        Subjective: I did my sit to stands at home and I walked at the store.  Yesterday was a bad day.    OBJECTIVE:  110/70 BP  HR  78  SPO2 96%    Precautions - pacemaker    Specialty Treatment Diary  25   Home exercise program  Reviewed HEP encouraged Sit to stand and timed ambulation to facilitate rehab progress Sit to stand 20\" surface  5 x 3 sets Walk for exercise within the home with AD, 2 min x 3-4 times    Sit to stand 5 x 4 sets   (Ther-ex, neuromuscular re-ed)Walking/endurance Treadmill ambulation  .7mph 4 min  Standing rest  .7mph 3 minutes     Treadmill .9 mph x3 min  Standing break  .8mph x 3 min 72 HR Treadmill 0.7 mph 2 min  0.7 mph 2 min  0.8 mph 2 min    (Neuromuscular re-ed) Static and dynamic balance/anticipatory / reactive Overground ambulation no assisted device  100'    100'  x 2   20# resistance  1:27 fastest      Blaze pods  12' line   4 pods  Random 1:00  6 count    Added 4\" platform  5 count    + lob x 3 with solostep   Blazepods in solostep  4 count random  12' line  1:00 x 2   6, 9    Added 4\" step  1:00x 2 Blazepods in solostep  1 min x 2 sets  10' line    Blazepods in solostep  1 min x 2 sets  4\" steps  1 min x 2 sets    Standing heel raise  10 bilaterally  10 each side (L assisted by R)    Walk 50 feet resisted with 20 lbs, timed with feedback, best around 21 sec  X 4 sets            (Ther-ex) Strengthening       (There-ex) Stretching                ASSESSMENT:Encouraged increased mobility and activity at " home and in the community for improved mobility and rehabilitative progression. Increased time on treadmill today.  Blazepods performed for reactive balance challenge.  Requires resting periods for recovery. Requires encouragement for task completion.  He will continue to benefit from skilled PT.   PLAN OF CARE:Reassess 2 minute walk test intermittently as well as 5TSTS  Patient will benefit from physical therapy 2-3 times per week for 2 months  Neuromuscular re-education, therapeutic exercises, and therapeutic activities as outlined in grids.    Melly Shabazz, PT  6/20/2025

## 2025-06-23 ENCOUNTER — OFFICE VISIT (OUTPATIENT)
Facility: REHABILITATION | Age: 85
End: 2025-06-23
Attending: INTERNAL MEDICINE
Payer: MEDICARE

## 2025-06-23 DIAGNOSIS — Z74.09 IMPAIRED MOBILITY: ICD-10-CM

## 2025-06-23 DIAGNOSIS — R26.2 AMBULATORY DYSFUNCTION: Primary | ICD-10-CM

## 2025-06-23 PROCEDURE — 97112 NEUROMUSCULAR REEDUCATION: CPT | Performed by: PHYSICAL THERAPIST

## 2025-06-23 NOTE — PROGRESS NOTES
"PHYSICAL THERAPY TREATMENT     Date: 25  Name: Mario Tidwell  : 1940  Referring Provider: Sebastien Parker DO  AUTHORIZATION:   Insurance: Payor: MEDICARE / Plan: MEDICARE A AND B / Product Type: Medicare A & B Fee for Service /     SUBJECTIVE:  HPI: Mario Tidwell is a 84 y.o. male referred to outpatient physical therapy for the following diagnosis   Encounter Diagnoses   Name Primary?    Ambulatory dysfunction Yes    Impaired mobility        Subjective: I did my sit to stands at home and I walked at the store.  Yesterday was a bad day.    OBJECTIVE:  110/70 BP  HR  78  SPO2 96%    Precautions - pacemaker    Specialty Treatment Diary  25   Home exercise program   Reviewed HEP encouraged Sit to stand and timed ambulation to facilitate rehab progress Sit to stand 20\" surface  5 x 3 sets Walk for exercise within the home with AD, 2 min x 3-4 times    Sit to stand 5 x 4 sets   (Ther-ex, neuromuscular re-ed)Walking/endurance Treadmill   1.0 mph 1 min   1.2 mph 1 min 20   Treadmill ambulation  .7mph 4 min  Standing rest  .7mph 3 minutes     Treadmill .9 mph x3 min  Standing break  .8mph x 3 min 72 HR Treadmill 0.7 mph 2 min  0.7 mph 2 min  0.8 mph 2 min    (Neuromuscular re-ed) Static and dynamic balance/anticipatory / reactive Testing below    Blaze pods  14' line   5 pods  Random 1:00 min    100 feet 10 lbs resistance  52 sec  50 sec  47 sec   Overground ambulation no assisted device  100'    100'  x 2   20# resistance  1:27 fastest      Blaze pods  12' line   4 pods  Random 1:00  6 count    Added 4\" platform  5 count    + lob x 3 with solostep   Blazepods in solostep  4 count random  12' line  1:00 x 2   6, 9    Added 4\" step  1:00x 2 Blazepods in solostep  1 min x 2 sets  10' line    Blazepods in solostep  1 min x 2 sets  4\" steps  1 min x 2 sets    Standing heel raise  10 bilaterally  10 each side (L assisted by R)    Walk 50 feet resisted with 20 lbs, timed " "with feedback, best around 21 sec  X 4 sets            (Ther-ex) Strengthening        (There-ex) Stretching                    Mobility Measures 6/23/25 6/12/25   5 Time Sit to Stand  (17\" chair, arms across chest) From 20\" surface completes 5 (arms across chest) in 21.5 sec Unable from 18\" surface    From 20\" surface completes 2-3 in about 12 seconds   3 Meter Timed  Up & Go 11.1 sec  With cane 12.9 seconds  With cane   Walking speed (self-selected)     Functional Gait Assessment (see below) 71314  97662    9/30 44691  05481    8/30   2 Minute Walk Test (100 foot circular course) 2 minute walk test   With cane in solostep    242 feet 2 minute walk test   With cane in solostep    206 feet    Ending heart rate 77 bpm  98% SpO2   Patient-Reported Outcome Measure: Activities-Specific Balance Confidence Scale (ABC Scale) Not tested 32%       ASSESSMENT:  Mario is a 84 year old male with mobility limitations and history of multiple falls.    Good improvement in 2 minute walk test, improving by 17%.    Also improved in functional lower extremity strength.  Continue per plan of care.     SHORT-TERM GOALS: by 7/11/25  1. Patient stands from 18\" surface without use of hands.  2. Patient walks at least 400 feet during 6 minute walk test.  3. Patient reports decreased incidence of falls/loss of balance.    LONG-TERM GOALS: by 9/12/25   1. Patient walks community distances with assistive device as needed.  2. Patient scores at least 15/30 on Functional Gait Assessment.    PLAN OF CARE:  Patient will benefit from physical therapy 2-3 times per week for 2 months  Neuromuscular re-education, therapeutic exercises, and therapeutic activities as outlined in grids.      Jhonny Carrington, PT  6/23/2025    "

## 2025-06-25 ENCOUNTER — OFFICE VISIT (OUTPATIENT)
Facility: REHABILITATION | Age: 85
End: 2025-06-25
Attending: INTERNAL MEDICINE
Payer: MEDICARE

## 2025-06-25 DIAGNOSIS — R26.2 AMBULATORY DYSFUNCTION: Primary | ICD-10-CM

## 2025-06-25 DIAGNOSIS — Z74.09 IMPAIRED MOBILITY: ICD-10-CM

## 2025-06-25 PROCEDURE — 97112 NEUROMUSCULAR REEDUCATION: CPT | Performed by: PHYSICAL THERAPIST

## 2025-06-25 NOTE — PROGRESS NOTES
"PHYSICAL THERAPY TREATMENT     Date: 25  Name: Mario Tidwell  : 1940  Referring Provider: Sebastien Parker DO  AUTHORIZATION:   Insurance: Payor: MEDICARE / Plan: MEDICARE A AND B / Product Type: Medicare A & B Fee for Service /     SUBJECTIVE:  HPI: Mario Tidwell is a 84 y.o. male referred to outpatient physical therapy for the following diagnosis   Encounter Diagnoses   Name Primary?    Ambulatory dysfunction Yes    Impaired mobility        Subjective: Doing okay, legs sore after Monday.    OBJECTIVE:  110/70 BP  HR  78  SPO2 96%    Precautions - pacemaker    Specialty Treatment Diary  25   Home exercise program    Reviewed HEP encouraged Sit to stand and timed ambulation to facilitate rehab progress Sit to stand 20\" surface  5 x 3 sets Walk for exercise within the home with AD, 2 min x 3-4 times    Sit to stand 5 x 4 sets   (Ther-ex, neuromuscular re-ed)Walking/endurance  Treadmill   1.0 mph 1 min   1.2 mph 1 min    Treadmill ambulation  .7mph 4 min  Standing rest  .7mph 3 minutes     Treadmill .9 mph x3 min  Standing break  .8mph x 3 min 72 HR Treadmill 0.7 mph 2 min  0.7 mph 2 min  0.8 mph 2 min    (Neuromuscular re-ed) Static and dynamic balance/anticipatory / reactive Blazepods in solostep with cane:  -15' line  1 min x 2 sets   - sidestep over 6\" hurdles  1 min x 2 sets  - forwards / backwards  1 min x 2 sets    100 feet 10 lbs resistance   47 sec  47 sec  42 sec    100 feet as fast as possible  36 sec  34 sec    Lateral step up to 6\" step about 7-8 each with  Testing below    Blaze pods  14' line   5 pods  Random 1:00 min    100 feet 10 lbs resistance  52 sec  50 sec  47 sec   Overground ambulation no assisted device  100'    100'  x 2   20# resistance  1:27 fastest      Blaze pods  12' line   4 pods  Random 1:00  6 count    Added 4\" platform  5 count    + lob x 3 with solostep   Blazepods in solostep  4 count random  12' line  1:00 x 2   6, " "9    Added 4\" step  1:00x 2 Blazepods in solostep  1 min x 2 sets  10' line    Blazepods in solostep  1 min x 2 sets  4\" steps  1 min x 2 sets    Standing heel raise  10 bilaterally  10 each side (L assisted by R)    Walk 50 feet resisted with 20 lbs, timed with feedback, best around 21 sec  X 4 sets            (Ther-ex) Strengthening         (There-ex) Stretching                      Mobility Measures 6/23/25 6/12/25   5 Time Sit to Stand  (17\" chair, arms across chest) From 20\" surface completes 5 (arms across chest) in 21.5 sec Unable from 18\" surface    From 20\" surface completes 2-3 in about 12 seconds   3 Meter Timed  Up & Go 11.1 sec  With cane 12.9 seconds  With cane   Walking speed (self-selected)     Functional Gait Assessment (see below) 84010  [id number]    9/30 04484  36179    8/30   2 Minute Walk Test (100 foot circular course) 2 minute walk test   With cane in solostep    242 feet 2 minute walk test   With cane in solostep    206 feet    Ending heart rate 77 bpm  98% SpO2   Patient-Reported Outcome Measure: Activities-Specific Balance Confidence Scale (ABC Scale) Not tested 32%       ASSESSMENT:  Mario is a 84 year old male with mobility limitations and history of multiple falls.    Challenged by lateral step up 8\" step.  Continue to challenge dynamic balance and lower extremity strength.    SHORT-TERM GOALS: by 7/11/25  1. Patient stands from 18\" surface without use of hands.  2. Patient walks at least 400 feet during 6 minute walk test.  3. Patient reports decreased incidence of falls/loss of balance.    LONG-TERM GOALS: by 9/12/25   1. Patient walks community distances with assistive device as needed.  2. Patient scores at least 15/30 on Functional Gait Assessment.    PLAN OF CARE:  Patient will benefit from physical therapy 2-3 times per week for 2 months  Neuromuscular re-education, therapeutic exercises, and therapeutic activities as outlined in grids.      Jhonny Carrington, PT  6/25/2025    "

## 2025-06-27 ENCOUNTER — OFFICE VISIT (OUTPATIENT)
Facility: REHABILITATION | Age: 85
End: 2025-06-27
Attending: INTERNAL MEDICINE
Payer: MEDICARE

## 2025-06-27 DIAGNOSIS — R26.2 AMBULATORY DYSFUNCTION: Primary | ICD-10-CM

## 2025-06-27 DIAGNOSIS — Z74.09 IMPAIRED MOBILITY: ICD-10-CM

## 2025-06-27 PROCEDURE — 97112 NEUROMUSCULAR REEDUCATION: CPT

## 2025-06-27 NOTE — PROGRESS NOTES
"PHYSICAL THERAPY TREATMENT     Date: 25  Name: Mario Tidwell  : 1940  Referring Provider: Sebastien Parker DO  AUTHORIZATION:   Insurance: Payor: MEDICARE / Plan: MEDICARE A AND B / Product Type: Medicare A & B Fee for Service /     SUBJECTIVE:  HPI: Mario Tidwell is a 84 y.o. male referred to outpatient physical therapy for the following diagnosis   Encounter Diagnoses   Name Primary?    Ambulatory dysfunction Yes    Impaired mobility        Subjective: States nothing new since last session.     OBJECTIVE:  118/56 BP  HR  58  SPO2 82, monitored throughout    Precautions - pacemaker    Specialty Treatment Diary  25   Home exercise program     Reviewed HEP encouraged Sit to stand and timed ambulation to facilitate rehab progress Sit to stand 20\" surface  5 x 3 sets   (Ther-ex, neuromuscular re-ed)Walking/endurance Treadmill intervals 4 min alternating   0.7mph 1 min   1.0mph 1 min           Treadmill   1.0 mph 1 min 11/20  1.2 mph 1 min 13/20   Treadmill ambulation  .7mph 4 min  Standing rest  .7mph 3 minutes     Treadmill .9 mph x3 min  Standing break  .8mph x 3 min 72 HR Treadmill 0.7 mph 2 min  0.7 mph 2 min  0.8 mph 2 min   (Neuromuscular re-ed) Static and dynamic balance/anticipatory / reactive Blaze pods with cane 90 sec  - 16 hits  Blaze pods without cane 90 sec 2x8ft rectangle  - 13 hits    - 12 hit     40ft speed walking   - 21 sec   - 18 sec   - 16 sec  - 15 sec    Lateral step up to 6\" step about 8 ea side   Blazepods in solostep with cane:  -15' line  1 min x 2 sets   - sidestep over 6\" hurdles  1 min x 2 sets  - forwards / backwards  1 min x 2 sets    100 feet 10 lbs resistance   47 sec  47 sec  42 sec    100 feet as fast as possible  36 sec  34 sec    Lateral step up to 6\" step about 7-8 each with  Testing below    Blaze pods  14' line   5 pods  Random 1:00 min    100 feet 10 lbs resistance  52 sec  50 sec  47 sec   Overground ambulation no " "assisted device  100'    100'  x 2   20# resistance  1:27 fastest      Blaze pods  12' line   4 pods  Random 1:00  6 count    Added 4\" platform  5 count    + lob x 3 with solostep   Blazepods in solostep  4 count random  12' line  1:00 x 2   6, 9    Added 4\" step  1:00x 2 Blazepods in solostep  1 min x 2 sets  10' line    Blazepods in solostep  1 min x 2 sets  4\" steps  1 min x 2 sets    Standing heel raise  10 bilaterally  10 each side (L assisted by R)    Walk 50 feet resisted with 20 lbs, timed with feedback, best around 21 sec  X 4 sets           (Ther-ex) Strengthening         (There-ex) Stretching                      Mobility Measures 6/23/25 6/12/25   5 Time Sit to Stand  (17\" chair, arms across chest) From 20\" surface completes 5 (arms across chest) in 21.5 sec Unable from 18\" surface    From 20\" surface completes 2-3 in about 12 seconds   3 Meter Timed  Up & Go 11.1 sec  With cane 12.9 seconds  With cane   Walking speed (self-selected)     Functional Gait Assessment (see below) [id number]  [id number]    9/30 [id number]  [id number]    8/30   2 Minute Walk Test (100 foot circular course) 2 minute walk test   With cane in solostep    242 feet 2 minute walk test   With cane in solostep    206 feet    Ending heart rate 77 bpm  98% SpO2   Patient-Reported Outcome Measure: Activities-Specific Balance Confidence Scale (ABC Scale) Not tested 32%       ASSESSMENT:  Mario is a 84 year old male with mobility limitations and history of multiple falls.    Practiced walking balance without cane today which was an appropriate challenge for pt. Required frequent rest breaks as he fatigues quickly. Monitored SpO2 throughout as it seemed low at the beginning of today's session, however likely due to faulty device as numbers were within appropriate range when pulse ox was swapped out. He will continue to benefit from skilled PT services.     SHORT-TERM GOALS: by 7/11/25  1. Patient stands from 18\" surface without use of hands.  2. Patient walks " at least 400 feet during 6 minute walk test.  3. Patient reports decreased incidence of falls/loss of balance.    LONG-TERM GOALS: by 9/12/25   1. Patient walks community distances with assistive device as needed.  2. Patient scores at least 15/30 on Functional Gait Assessment.    PLAN OF CARE:  Patient will benefit from physical therapy 2-3 times per week for 2 months  Neuromuscular re-education, therapeutic exercises, and therapeutic activities as outlined in grids.      Carolina Wallace, PT  6/27/2025

## 2025-06-30 ENCOUNTER — OFFICE VISIT (OUTPATIENT)
Facility: REHABILITATION | Age: 85
End: 2025-06-30
Attending: INTERNAL MEDICINE
Payer: MEDICARE

## 2025-06-30 DIAGNOSIS — R26.2 AMBULATORY DYSFUNCTION: Primary | ICD-10-CM

## 2025-06-30 DIAGNOSIS — Z74.09 IMPAIRED MOBILITY: ICD-10-CM

## 2025-06-30 PROCEDURE — 97112 NEUROMUSCULAR REEDUCATION: CPT | Performed by: PHYSICAL THERAPIST

## 2025-06-30 PROCEDURE — 97110 THERAPEUTIC EXERCISES: CPT | Performed by: PHYSICAL THERAPIST

## 2025-06-30 NOTE — PROGRESS NOTES
"PHYSICAL THERAPY TREATMENT     Date: 25  Name: Mario Tidwell  : 1940  Referring Provider: Sebastien Parker DO  AUTHORIZATION:   Insurance: Payor: MEDICARE / Plan: MEDICARE A AND B / Product Type: Medicare A & B Fee for Service /     SUBJECTIVE:  HPI: Mario Tidwell is a 84 y.o. male referred to outpatient physical therapy for the following diagnosis   Encounter Diagnoses   Name Primary?    Ambulatory dysfunction Yes    Impaired mobility        Subjective: Feeling ok today. Notes his biggest challenge is getting into his pickup truck due to strength. Not walking much at home, but is doing sit to stands from a chair.     OBJECTIVE:  104/50 BP  (Unable to obtain SpO2)       Precautions - pacemaker    Specialty Treatment Diary  25   Home exercise program      Reviewed HEP encouraged Sit to stand and timed ambulation to facilitate rehab progress Sit to stand 20\" surface  5 x 3 sets   (Ther-ex, neuromuscular re-ed)Walking/endurance Treadmill 1.3mph  -4min  (Stand rest break)  -3min  Treadmill intervals 4 min alternating   0.7mph 1 min   1.0mph 1 min           Treadmill   1.0 mph 1 min 11/20  1.2 mph 1 min 13/20   Treadmill ambulation  .7mph 4 min  Standing rest  .7mph 3 minutes     Treadmill .9 mph x3 min  Standing break  .8mph x 3 min 72 HR Treadmill 0.7 mph 2 min  0.7 mph 2 min  0.8 mph 2 min   (Neuromuscular re-ed) Static and dynamic balance/anticipatory / reactive Step ups 6in, SPC.   -Lat X8 ea   -Fwd x8 ea    Speed walking, SPC, 100ft w/ turn x4 (best 29s)      Blaze pods spaced over 20ft no AD  90s x2    Blaze pods with cane 90 sec  - 16 hits  Blaze pods without cane 90 sec 2x8ft rectangle  - 13 hits    - 12 hit     40ft speed walking   - 21 sec   - 18 sec   - 16 sec  - 15 sec    Lateral step up to 6\" step about 8 ea side   Blazepods in solostep with cane:  -15' line  1 min x 2 sets   - sidestep over 6\" hurdles  1 min x 2 sets  - forwards / backwards  1 " "min x 2 sets    100 feet 10 lbs resistance   47 sec  47 sec  42 sec    100 feet as fast as possible  36 sec  34 sec    Lateral step up to 6\" step about 7-8 each with  Testing below    Blaze pods  14' line   5 pods  Random 1:00 min    100 feet 10 lbs resistance  52 sec  50 sec  47 sec   Overground ambulation no assisted device  100'    100'  x 2   20# resistance  1:27 fastest      Blaze pods  12' line   4 pods  Random 1:00  6 count    Added 4\" platform  5 count    + lob x 3 with solostep   Blazepods in solostep  4 count random  12' line  1:00 x 2   6, 9    Added 4\" step  1:00x 2 Blazepods in solostep  1 min x 2 sets  10' line    Blazepods in solostep  1 min x 2 sets  4\" steps  1 min x 2 sets    Standing heel raise  10 bilaterally  10 each side (L assisted by R)    Walk 50 feet resisted with 20 lbs, timed with feedback, best around 21 sec  X 4 sets           (Ther-ex) Strengthening          (There-ex) Stretching                        Mobility Measures 6/23/25 6/12/25   5 Time Sit to Stand  (17\" chair, arms across chest) From 20\" surface completes 5 (arms across chest) in 21.5 sec Unable from 18\" surface    From 20\" surface completes 2-3 in about 12 seconds   3 Meter Timed  Up & Go 11.1 sec  With cane 12.9 seconds  With cane   Walking speed (self-selected)     Functional Gait Assessment (see below) [id number]  [id number]    9/30 27915  99704    8/30   2 Minute Walk Test (100 foot circular course) 2 minute walk test   With cane in solostep    242 feet 2 minute walk test   With cane in solostep    206 feet    Ending heart rate 77 bpm  98% SpO2   Patient-Reported Outcome Measure: Activities-Specific Balance Confidence Scale (ABC Scale) Not tested 32%       ASSESSMENT: Tolerated treatment well. Able to progress treadmill speed today while maintaining similar duration. Challenged with step ups demonstrating inability to fully extend knees while rising secondary to weakness. L trendelenburg and instability noted when walking w/o SPC " "during blaze pods, needing harness to maintain safety.     SHORT-TERM GOALS: by 7/11/25  1. Patient stands from 18\" surface without use of hands.  2. Patient walks at least 400 feet during 6 minute walk test.  3. Patient reports decreased incidence of falls/loss of balance.    LONG-TERM GOALS: by 9/12/25   1. Patient walks community distances with assistive device as needed.  2. Patient scores at least 15/30 on Functional Gait Assessment.    PLAN OF CARE:  Patient will benefit from physical therapy 2-3 times per week for 2 months  Neuromuscular re-education, therapeutic exercises, and therapeutic activities as outlined in grids.      lVad Dyer, PT  6/30/2025    "

## 2025-07-02 ENCOUNTER — OFFICE VISIT (OUTPATIENT)
Facility: REHABILITATION | Age: 85
End: 2025-07-02
Attending: INTERNAL MEDICINE
Payer: MEDICARE

## 2025-07-02 DIAGNOSIS — R26.2 AMBULATORY DYSFUNCTION: Primary | ICD-10-CM

## 2025-07-02 DIAGNOSIS — Z74.09 IMPAIRED MOBILITY: ICD-10-CM

## 2025-07-02 PROCEDURE — 97110 THERAPEUTIC EXERCISES: CPT | Performed by: PHYSICAL THERAPIST

## 2025-07-02 PROCEDURE — 97112 NEUROMUSCULAR REEDUCATION: CPT | Performed by: PHYSICAL THERAPIST

## 2025-07-02 NOTE — PROGRESS NOTES
"PHYSICAL THERAPY TREATMENT / UPDATE    Date: 25  Name: Mario Tidwell  : 1940  Referring Provider: Sebastien Parker DO  AUTHORIZATION:   Insurance: Payor: MEDICARE / Plan: MEDICARE A AND B / Product Type: Medicare A & B Fee for Service /     SUBJECTIVE:  HPI: Mario Tidwell is a 84 y.o. male referred to outpatient physical therapy for the following diagnosis   Encounter Diagnoses   Name Primary?    Ambulatory dysfunction Yes    Impaired mobility        Subjective:   Wearing brace regularly to prevent tripping and fall.  Challenged getting into truck, depends on which foot putting on running board.    OBJECTIVE:  104/50 BP  (Unable to obtain SpO2)       Precautions - pacemaker    Specialty Treatment Diary  25   Home exercise program       Reviewed HEP encouraged Sit to stand and timed ambulation to facilitate rehab progress Sit to stand 20\" surface  5 x 3 sets   (Ther-ex, neuromuscular re-ed)Walking/endurance Treadmill 1.2 mph 4 min   Treadmill 1.3mph  -4min  (Stand rest break)  -3min  Treadmill intervals 4 min alternating   0.7mph 1 min   1.0mph 1 min           Treadmill   1.0 mph 1 min   1.2 mph 1 min /20   Treadmill ambulation  .7mph 4 min  Standing rest  .7mph 3 minutes     Treadmill .9 mph x3 min  Standing break  .8mph x 3 min 72 HR Treadmill 0.7 mph 2 min  0.7 mph 2 min  0.8 mph 2 min   (Neuromuscular re-ed) Static and dynamic balance/anticipatory / reactive Testing below    Blazepods in solostep  16 feet  With 2\" foam mat in between  No AD  1 min x 2 sets  __  1 min x 2 sets Step ups 6in, SPC.   -Lat X8 ea   -Fwd x8 ea    Speed walking, SPC, 100ft w/ turn x4 (best 29s)      Blaze pods spaced over 20ft no AD  90s x2    Blaze pods with cane 90 sec  - 16 hits  Blaze pods without cane 90 sec 2x8ft rectangle  - 13 hits    - 12 hit     40ft speed walking   - 21 sec   - 18 sec   - 16 sec  - 15 sec    Lateral step up to 6\" step about 8 ea side  " " Blazepods in solostep with cane:  -15' line  1 min x 2 sets   - sidestep over 6\" hurdles  1 min x 2 sets  - forwards / backwards  1 min x 2 sets    100 feet 10 lbs resistance   47 sec  47 sec  42 sec    100 feet as fast as possible  36 sec  34 sec    Lateral step up to 6\" step about 7-8 each with  Testing below    Blaze pods  14' line   5 pods  Random 1:00 min    100 feet 10 lbs resistance  52 sec  50 sec  47 sec   Overground ambulation no assisted device  100'    100'  x 2   20# resistance  1:27 fastest      Blaze pods  12' line   4 pods  Random 1:00  6 count    Added 4\" platform  5 count    + lob x 3 with solostep   Blazepods in solostep  4 count random  12' line  1:00 x 2   6, 9    Added 4\" step  1:00x 2 Blazepods in solostep  1 min x 2 sets  10' line    Blazepods in solostep  1 min x 2 sets  4\" steps  1 min x 2 sets    Standing heel raise  10 bilaterally  10 each side (L assisted by R)    Walk 50 feet resisted with 20 lbs, timed with feedback, best around 21 sec  X 4 sets           (Ther-ex) Strengthening           (There-ex) Stretching                          Mobility Measures 7/02/25 6/23/25 6/12/25   5 Time Sit to Stand  (17\" chair, arms across chest) From 20\" surface completes 5 (arms across chest) in 15.5  sec    Completes 1 repetition with 2 attempts from 18\" surface From 20\" surface completes 5 (arms across chest) in 21.5 sec Unable from 18\" surface    From 20\" surface completes 2-3 in about 12 seconds   3 Meter Timed  Up & Go  11.1 sec  With cane 12.9 seconds  With cane   Walking speed (self-selected)      Functional Gait Assessment (see below) [id number]  46608    12/30   82451  95279    9/30 10515  96714    8/30   2 Minute Walk Test (100 foot circular course) 2 minute walk test  With cane no solostep    300 feet 2 minute walk test   With cane in solostep    242 feet 2 minute walk test   With cane in solostep    206 feet    Ending heart rate 77 bpm  98% SpO2   Patient-Reported Outcome Measure: " "Activities-Specific Balance Confidence Scale (ABC Scale) Not tested Not tested 32%       ASSESSMENT:   Good improvement in mobility measures since starting therapy 3 weeks ago.  He is better able to get up from a low surface.  He is walking further with a 2 minute walk test.  He is more stable with a functional gait assessment test.  He continues with increased risk of falling given left leg weakness and impaired balance reactions.    Recommend continued physical therapy to maximize mobility.        SHORT-TERM GOALS: by 7/11/25  1. Patient stands from 18\" surface without use of hands.  MET   2. Patient walks at least 400 feet during 6 minute walk test.  NOT TESTED, likely met  based on 300 feet with 2 minute walk test.  3. Patient reports decreased incidence of falls/loss of balance.  MET..    LONG-TERM GOALS: by 9/12/25   1. Patient walks community distances with assistive device as needed.  2. Patient scores at least 15/30 on Functional Gait Assessment.    PLAN OF CARE:  Patient will benefit from physical therapy 2-3 times per week for 2 months  Neuromuscular re-education, therapeutic exercises, and therapeutic activities as outlined in grids.      Jhonny Carrington, PT  7/2/2025    "

## 2025-07-02 NOTE — LETTER
July 3, 2025    Sebastien Parker DO  [address]  [address]  [facility] [id number]    Patient: Mario Tidwell   YOB: 1940   Date of Visit: 2025     Encounter Diagnosis     ICD-10-CM    1. Ambulatory dysfunction  R26.2       2. Impaired mobility  Z74.09           Dear Dr. Sebastien Parker, :    Thank you for your recent referral of Mario Tidwell. Please review the attached evaluation summary from Mario's recent visit.     Please verify that you agree with the plan of care by signing the attached order.     If you have any questions or concerns, please do not hesitate to call.     I sincerely appreciate the opportunity to share in the care of one of your patients and hope to have another opportunity to work with you in the near future.       Sincerely,    Jhonny Carrington, PT      Referring Provider:      I certify that I have read the below Plan of Care and certify the need for these services furnished under this plan of treatment while under my care.                    Sebastien Parker DO  [address]  [address]  [facility] [address]  Via Fax: [phone]          PHYSICAL THERAPY TREATMENT / UPDATE    Date: 25  Name: Mario Tidwell  : 1940  Referring Provider: Sebastien Parker DO  AUTHORIZATION:   Insurance: Payor: MEDICARE / Plan: MEDICARE A AND B / Product Type: Medicare A & B Fee for Service /     SUBJECTIVE:  HPI: Mario Tidwell is a 84 y.o. male referred to outpatient physical therapy for the following diagnosis   Encounter Diagnoses   Name Primary?   • Ambulatory dysfunction Yes   • Impaired mobility        Subjective:   Wearing brace regularly to prevent tripping and fall.  Challenged getting into truck, depends on which foot putting on running board.    OBJECTIVE:  104/50 BP  (Unable to obtain SpO2)       Precautions - pacemaker    Specialty Treatment Diary  25   Home exercise program       Reviewed HEP  "encouraged Sit to stand and timed ambulation to facilitate rehab progress Sit to stand 20\" surface  5 x 3 sets   (Ther-ex, neuromuscular re-ed)Walking/endurance Treadmill 1.2 mph 4 min   Treadmill 1.3mph  -4min  (Stand rest break)  -3min  Treadmill intervals 4 min alternating   0.7mph 1 min   1.0mph 1 min           Treadmill   1.0 mph 1 min 11/20  1.2 mph 1 min 13/20   Treadmill ambulation  .7mph 4 min  Standing rest  .7mph 3 minutes     Treadmill .9 mph x3 min  Standing break  .8mph x 3 min 72 HR Treadmill 0.7 mph 2 min  0.7 mph 2 min  0.8 mph 2 min   (Neuromuscular re-ed) Static and dynamic balance/anticipatory / reactive Testing below    Blazepods in solostep  16 feet  With 2\" foam mat in between  No AD  1 min x 2 sets  __  1 min x 2 sets Step ups 6in, SPC.   -Lat X8 ea   -Fwd x8 ea    Speed walking, SPC, 100ft w/ turn x4 (best 29s)      Blaze pods spaced over 20ft no AD  90s x2    Blaze pods with cane 90 sec  - 16 hits  Blaze pods without cane 90 sec 2x8ft rectangle  - 13 hits    - 12 hit     40ft speed walking   - 21 sec   - 18 sec   - 16 sec  - 15 sec    Lateral step up to 6\" step about 8 ea side   Blazepods in [facility] with cane:  -15' line  1 min x 2 sets   - sidestep over 6\" hurdles  1 min x 2 sets  - forwards / backwards  1 min x 2 sets    100 feet 10 lbs resistance   47 sec  47 sec  42 sec    100 feet as fast as possible  36 sec  34 sec    Lateral step up to 6\" step about 7-8 each with  Testing below    Blaze pods  14' line   5 pods  Random 1:00 min    100 feet 10 lbs resistance  52 sec  50 sec  47 sec   Overground ambulation no assisted device  100'    100'  x 2   20# resistance  1:27 fastest      Blaze pods  12' line   4 pods  Random 1:00  6 count    Added 4\" platform  5 count    + lob x 3 with solostep   Blazepods in soloste  4 count random  12' line  1:00 x 2   6, 9    Added 4\" step  1:00x 2 Blazepods in solostep  1 min x 2 sets  10' line    Blazepods in solostep  1 min x 2 sets  4\" steps  1 min x 2 " "sets    Standing heel raise  10 bilaterally  10 each side (L assisted by R)    Walk 50 feet resisted with 20 lbs, timed with feedback, best around 21 sec  X 4 sets           (Ther-ex) Strengthening           (There-ex) Stretching                          Mobility Measures 7/02/25 6/23/25 6/12/25   5 Time Sit to Stand  (17\" chair, arms across chest) From 20\" surface completes 5 (arms across chest) in 15.5  sec    Completes 1 repetition with 2 attempts from 18\" surface From 20\" surface completes 5 (arms across chest) in 21.5 sec Unable from 18\" surface    From 20\" surface completes 2-3 in about 12 seconds   3 Meter Timed  Up & Go  11.1 sec  With cane 12.9 seconds  With cane   Walking speed (self-selected)      Functional Gait Assessment (see below) [id number]  [id number]    12/30   10019  52930    9/30 24291  49983    8/30   2 Minute Walk Test (100 foot circular course) 2 minute walk test  With cane no solostep    300 feet 2 minute walk test   With cane in solostep    242 feet 2 minute walk test   With cane in solostep    206 feet    Ending heart rate 77 bpm  98% SpO2   Patient-Reported Outcome Measure: Activities-Specific Balance Confidence Scale (ABC Scale) Not tested Not tested 32%       ASSESSMENT:   Good improvement in mobility measures since starting therapy 3 weeks ago.  He is better able to get up from a low surface.  He is walking further with a 2 minute walk test.  He is more stable with a functional gait assessment test.  He continues with increased risk of falling given left leg weakness and impaired balance reactions.    Recommend continued physical therapy to maximize mobility.        SHORT-TERM GOALS: by 7/11/25  1. Patient stands from 18\" surface without use of hands.  MET   2. Patient walks at least 400 feet during 6 minute walk test.  NOT TESTED, likely met  based on 300 feet with 2 minute walk test.  3. Patient reports decreased incidence of falls/loss of balance.  MET..    LONG-TERM GOALS: by 9/12/25   1. " Patient walks community distances with assistive device as needed.  2. Patient scores at least 15/30 on Functional Gait Assessment.    PLAN OF CARE:  Patient will benefit from physical therapy 2-3 times per week for 2 months  Neuromuscular re-education, therapeutic exercises, and therapeutic activities as outlined in grids.      Jhonny Carrington, PT  7/2/2025

## 2025-07-07 ENCOUNTER — OFFICE VISIT (OUTPATIENT)
Facility: REHABILITATION | Age: 85
End: 2025-07-07
Attending: INTERNAL MEDICINE
Payer: MEDICARE

## 2025-07-07 DIAGNOSIS — R26.2 AMBULATORY DYSFUNCTION: Primary | ICD-10-CM

## 2025-07-07 DIAGNOSIS — Z74.09 IMPAIRED MOBILITY: ICD-10-CM

## 2025-07-07 PROCEDURE — 97112 NEUROMUSCULAR REEDUCATION: CPT | Performed by: PHYSICAL THERAPIST

## 2025-07-07 NOTE — PROGRESS NOTES
"PHYSICAL THERAPY TREATMENT     Date: 25  Name: Mario Tidwell  : 1940  Referring Provider: Sebastien Parker DO  AUTHORIZATION:   Insurance: Payor: MEDICARE / Plan: MEDICARE A AND B / Product Type: Medicare A & B Fee for Service /     HPI: Mario Tidwell is a 85 y.o. male referred to outpatient physical therapy for the following diagnosis   Encounter Diagnoses   Name Primary?    Ambulatory dysfunction Yes    Impaired mobility        Subjective: Able to step up into truck.    OBJECTIVE:    Precautions - pacemaker    Specialty Treatment Diary  25   Home exercise program        Reviewed HEP encouraged Sit to stand and timed ambulation to facilitate rehab progress Sit to stand 20\" surface  5 x 3 sets   (Ther-ex, neuromuscular re-ed)Walking/endurance  Treadmill 1.2 mph 4 min   Treadmill 1.3mph  -4min  (Stand rest break)  -3min  Treadmill intervals 4 min alternating   0.7mph 1 min   1.0mph 1 min           Treadmill   1.0 mph 1 min /20  1.2 mph 1 min 13/20   Treadmill ambulation  .7mph 4 min  Standing rest  .7mph 3 minutes     Treadmill .9 mph x3 min  Standing break  .8mph x 3 min 72 HR Treadmill 0.7 mph 2 min  0.7 mph 2 min  0.8 mph 2 min   (Neuromuscular re-ed) Static and dynamic balance/anticipatory / reactive 2MWT  290 feet    100 feet pulling 20 lbs  37 sec  32 sec  33 sec  32 sec    3 times sit to stand 19\" surface  Hands out front   7 sec  18\" surface  1 rep hands out front  X 5 total    Blazepods in solostep  14' line 1 min  4\" step 10\" tap    Walking forwards and backwards catching and tossing ball 2 min  Walking turn and catch bounced ball 2 min    Blazepods foam incline and turning, 1 min x 2 sets     Testing below    Blazepods in solostep  16 feet  With 2\" foam mat in between  No AD  1 min x 2 sets  __  1 min x 2 sets Step ups 6in, SPC.   -Lat X8 ea   -Fwd x8 ea    Speed walking, SPC, 100ft w/ turn x4 (best 29s)      Blaze pods spaced " "over 20ft no AD  90s x2    Blaze pods with cane 90 sec  - 16 hits  Blaze pods without cane 90 sec 2x8ft rectangle  - 13 hits    - 12 hit     40ft speed walking   - 21 sec   - 18 sec   - 16 sec  - 15 sec    Lateral step up to 6\" step about 8 ea side   Blazepods in solostep with cane:  -15' line  1 min x 2 sets   - sidestep over 6\" hurdles  1 min x 2 sets  - forwards / backwards  1 min x 2 sets    100 feet 10 lbs resistance   47 sec  47 sec  42 sec    100 feet as fast as possible  36 sec  34 sec    Lateral step up to 6\" step about 7-8 each with  Testing below    Blaze pods  14' line   5 pods  Random 1:00 min    100 feet 10 lbs resistance  52 sec  50 sec  47 sec   Overground ambulation no assisted device  100'    100'  x 2   20# resistance  1:27 fastest      Blaze pods  12' line   4 pods  Random 1:00  6 count    Added 4\" platform  5 count    + lob x 3 with solostep   Blazepods in solostep  4 count random  12' line  1:00 x 2   6, 9    Added 4\" step  1:00x 2 Blazepods in solostep  1 min x 2 sets  10' line    Blazepods in solostep  1 min x 2 sets  4\" steps  1 min x 2 sets    Standing heel raise  10 bilaterally  10 each side (L assisted by R)    Walk 50 feet resisted with 20 lbs, timed with feedback, best around 21 sec  X 4 sets           (Ther-ex) Strengthening            (There-ex) Stretching                            Mobility Measures 7/02/25 6/23/25 6/12/25   5 Time Sit to Stand  (17\" chair, arms across chest) From 20\" surface completes 5 (arms across chest) in 15.5  sec    Completes 1 repetition with 2 attempts from 18\" surface From 20\" surface completes 5 (arms across chest) in 21.5 sec Unable from 18\" surface    From 20\" surface completes 2-3 in about 12 seconds   3 Meter Timed  Up & Go  11.1 sec  With cane 12.9 seconds  With cane   Walking speed (self-selected)      Functional Gait Assessment (see below) [id number]  [id number]    12/30   [id number]  [id number]    9/30 66057  [id number]    8/30   2 Minute Walk Test (100 foot circular " "course) 2 minute walk test  With cane no solostep    300 feet 2 minute walk test   With cane in solostep    242 feet 2 minute walk test   With cane in solostep    206 feet    Ending heart rate 77 bpm  98% SpO2   Patient-Reported Outcome Measure: Activities-Specific Balance Confidence Scale (ABC Scale) Not tested Not tested 32%       ASSESSMENT:   Good further improvement in getting up and down from 18\" surface.  Continue to challenge dynamic/reactive balance.         Recommend continued physical therapy to maximize mobility.        SHORT-TERM GOALS: by 7/11/25  1. Patient stands from 18\" surface without use of hands.  MET   2. Patient walks at least 400 feet during 6 minute walk test.  NOT TESTED, likely met  based on 300 feet with 2 minute walk test.  3. Patient reports decreased incidence of falls/loss of balance.  MET..    LONG-TERM GOALS: by 9/12/25   1. Patient walks community distances with assistive device as needed.  2. Patient scores at least 15/30 on Functional Gait Assessment.    PLAN OF CARE:  Patient will benefit from physical therapy 2-3 times per week for 2 months  Neuromuscular re-education, therapeutic exercises, and therapeutic activities as outlined in grids.      Jhonny Carrington, PT  7/7/2025    "

## 2025-07-09 ENCOUNTER — OFFICE VISIT (OUTPATIENT)
Facility: REHABILITATION | Age: 85
End: 2025-07-09
Attending: INTERNAL MEDICINE
Payer: MEDICARE

## 2025-07-09 DIAGNOSIS — Z74.09 IMPAIRED MOBILITY: ICD-10-CM

## 2025-07-09 DIAGNOSIS — R26.2 AMBULATORY DYSFUNCTION: Primary | ICD-10-CM

## 2025-07-09 PROCEDURE — 97112 NEUROMUSCULAR REEDUCATION: CPT | Performed by: PHYSICAL THERAPIST

## 2025-07-09 NOTE — PROGRESS NOTES
"PHYSICAL THERAPY TREATMENT     Date: 25  Name: Mario Tidwell  : 1940  Referring Provider: Sebastien Parker DO  AUTHORIZATION:   Insurance: Payor: MEDICARE / Plan: MEDICARE A AND B / Product Type: Medicare A & B Fee for Service /     HPI: Mario Tidwell is a 85 y.o. male referred to outpatient physical therapy for the following diagnosis   Encounter Diagnoses   Name Primary?    Ambulatory dysfunction Yes    Impaired mobility        Subjective: Doing well, working on getting up and down from a couple chairs.    OBJECTIVE:    Precautions - pacemaker    Specialty Treatment Diary  25   Home exercise program         Reviewed HEP encouraged Sit to stand and timed ambulation to facilitate rehab progress Sit to stand 20\" surface  5 x 3 sets   (Ther-ex, neuromuscular re-ed)Walking/endurance   Treadmill 1.2 mph 4 min   Treadmill 1.3mph  -4min  (Stand rest break)  -3min  Treadmill intervals 4 min alternating   0.7mph 1 min   1.0mph 1 min           Treadmill   1.0 mph 1 min 11/20  1.2 mph 1 min 13/20   Treadmill ambulation  .7mph 4 min  Standing rest  .7mph 3 minutes     Treadmill .9 mph x3 min  Standing break  .8mph x 3 min 72 HR Treadmill 0.7 mph 2 min  0.7 mph 2 min  0.8 mph 2 min   (Neuromuscular re-ed) Static and dynamic balance/anticipatory / reactive 2MWT  285 feet  `  Blazepods in solostep  14' line   4\" step 10\" tap  1 min x 2 sets    Resisted 100 feet walking  Pull 20 lbs  No assistive device  35 sec  60 sec    20 lbs with cane  32 sec  32 sec    2 times sit to stand   18\" surface  4 sets    Blazepods with solostep  Forwards / backwards 10' 1 min x 2 sets with cane  Sidestep over 6\" hurdles  1 min x 2 sets with cane         2MWT  290 feet    100 feet pulling 20 lbs  37 sec  32 sec  33 sec  32 sec    3 times sit to stand 19\" surface  Hands out front   7 sec  18\" surface  1 rep hands out front  X 5 total    Blazepods in solostep  14' " "line 1 min  4\" step 10\" tap    Walking forwards and backwards catching and tossing ball 2 min  Walking turn and catch bounced ball 2 min    Blazepods foam incline and turning, 1 min x 2 sets     Testing below    Blazepods in solostep  16 feet  With 2\" foam mat in between  No AD  1 min x 2 sets  __  1 min x 2 sets Step ups 6in, SPC.   -Lat X8 ea   -Fwd x8 ea    Speed walking, SPC, 100ft w/ turn x4 (best 29s)      Blaze pods spaced over 20ft no AD  90s x2    Blaze pods with cane 90 sec  - 16 hits  Blaze pods without cane 90 sec 2x8ft rectangle  - 13 hits    - 12 hit     40ft speed walking   - 21 sec   - 18 sec   - 16 sec  - 15 sec    Lateral step up to 6\" step about 8 ea side   Blazepods in solostep with cane:  -15' line  1 min x 2 sets   - sidestep over 6\" hurdles  1 min x 2 sets  - forwards / backwards  1 min x 2 sets    100 feet 10 lbs resistance   47 sec  47 sec  42 sec    100 feet as fast as possible  36 sec  34 sec    Lateral step up to 6\" step about 7-8 each with  Testing below    Blaze pods  14' line   5 pods  Random 1:00 min    100 feet 10 lbs resistance  52 sec  50 sec  47 sec   Overground ambulation no assisted device  100'    100'  x 2   20# resistance  1:27 fastest      Blaze pods  12' line   4 pods  Random 1:00  6 count    Added 4\" platform  5 count    + lob x 3 with solostep   Blazepods in solostep  4 count random  12' line  1:00 x 2   6, 9    Added 4\" step  1:00x 2 Blazepods in solostep  1 min x 2 sets  10' line    Blazepods in solostep  1 min x 2 sets  4\" steps  1 min x 2 sets    Standing heel raise  10 bilaterally  10 each side (L assisted by R)    Walk 50 feet resisted with 20 lbs, timed with feedback, best around 21 sec  X 4 sets           (Ther-ex) Strengthening             (There-ex) Stretching                              Mobility Measures 7/09/25 7/02/25 6/23/25 6/12/25   5 Time Sit to Stand  (17\" chair, arms across chest)  From 20\" surface completes 5 (arms across chest) in 15.5  " "sec    Completes 1 repetition with 2 attempts from 18\" surface From 20\" surface completes 5 (arms across chest) in 21.5 sec Unable from 18\" surface    From 20\" surface completes 2-3 in about 12 seconds   3 Meter Timed  Up & Go   11.1 sec  With cane 12.9 seconds  With cane   Walking speed (self-selected)       Functional Gait Assessment (see below)  [id number]  [id number]    12/30   [id number]  [id number]    9/30 [id number]  22748    8/30   2 Minute Walk Test (100 foot circular course)  2 minute walk test  With cane no solostep    300 feet 2 minute walk test   With cane in solostep    242 feet 2 minute walk test   With cane in solostep    206 feet    Ending heart rate 77 bpm  98% SpO2   Patient-Reported Outcome Measure: Activities-Specific Balance Confidence Scale (ABC Scale)  Not tested Not tested 32%       ASSESSMENT:   Good further improvement in getting up and down from 18\" surface. Completes at least 2 repetitions in a row today.  Challenged by resisted walking without assistive device.   Continue to challenge dynamic/reactive balance.       Recommend continued physical therapy to maximize mobility.        SHORT-TERM GOALS: by 7/11/25, continued to 8/01/25  1. Patient stands from 18\" surface without use of hands.  MET   2. Patient walks at least 400 feet during 6 minute walk test.  NOT TESTED, likely met  based on 300 feet with 2 minute walk test.  3. Patient reports decreased incidence of falls/loss of balance.  MET..    LONG-TERM GOALS: by 9/12/25   1. Patient walks community distances with assistive device as needed.  2. Patient scores at least 15/30 on Functional Gait Assessment.    PLAN OF CARE:  Patient will benefit from physical therapy 2-3 times per week for 2 months  Neuromuscular re-education, therapeutic exercises, and therapeutic activities as outlined in grids.      Jhonny Carrington, PT  7/9/2025    "

## 2025-07-11 ENCOUNTER — APPOINTMENT (OUTPATIENT)
Facility: REHABILITATION | Age: 85
End: 2025-07-11
Attending: INTERNAL MEDICINE
Payer: MEDICARE

## 2025-07-14 ENCOUNTER — EVALUATION (OUTPATIENT)
Facility: REHABILITATION | Age: 85
End: 2025-07-14
Attending: INTERNAL MEDICINE
Payer: MEDICARE

## 2025-07-14 DIAGNOSIS — Z74.09 IMPAIRED MOBILITY: ICD-10-CM

## 2025-07-14 DIAGNOSIS — R26.2 AMBULATORY DYSFUNCTION: Primary | ICD-10-CM

## 2025-07-14 PROCEDURE — 97112 NEUROMUSCULAR REEDUCATION: CPT | Performed by: PHYSICAL THERAPIST

## 2025-07-14 NOTE — PROGRESS NOTES
"PHYSICAL THERAPY TREATMENT     Date: 25  Name: Mario Tidwell  : 1940  Referring Provider: Sebastien Parker DO  AUTHORIZATION:   Insurance: Payor: MEDICARE / Plan: MEDICARE A AND B / Product Type: Medicare A & B Fee for Service /     HPI: Mario Tidwell is a 85 y.o. male referred to outpatient physical therapy for the following diagnosis   Encounter Diagnoses   Name Primary?    Ambulatory dysfunction Yes    Impaired mobility          Subjective: I was very congested over the weekend. I had to take musinex which I had from when I had this last month. I had a call from [facility] that I was retaining water. They had me on a water pill over the weekend        OBJECTIVE:  Auscultation: no rails or crackles at this time. Good air exchange.  Spo2/HR assessed throughout.  Spo2 above 97%  BP 90/60  HR 84  No LE edema at this time.    Precautions - pacemaker    Specialty Treatment Diary  25   Home exercise program         Reviewed HEP encouraged Sit to stand and timed ambulation to facilitate rehab progress Sit to stand 20\" surface  5 x 3 sets   (Ther-ex, neuromuscular re-ed)Walking/endurance   Treadmill 1.2 mph 4 min   Treadmill 1.3mph  -4min  (Stand rest break)  -3min  Treadmill intervals 4 min alternating   0.7mph 1 min   1.0mph 1 min           Treadmill   1.0 mph 1 min /20  1.2 mph 1 min 13/20   Treadmill ambulation  .7mph 4 min  Standing rest  .7mph 3 minutes     Treadmill .9 mph x3 min  Standing break  .8mph x 3 min 72 HR Treadmill 0.7 mph 2 min  0.7 mph 2 min  0.8 mph 2 min   (Neuromuscular re-ed) Static and dynamic balance/anticipatory / reactive 2MWT  285 feet  `  Blazepods in solostep  14' line   4\" step 10\" tap  1 min x 2 sets    Resisted 100 feet walking  Pull 20 lbs  No assistive device  35 sec  60 sec    20 lbs with cane  32 sec  32 sec    2 times sit to stand   18\" surface  4 sets    Blazepods with solostep  Forwards / backwards 10' " "1 min x 2 sets with cane  Sidestep over 6\" hurdles  1 min x 2 sets with cane         2MWT  290 feet    100 feet pulling 20 lbs  37 sec  32 sec  33 sec  32 sec    3 times sit to stand 19\" surface  Hands out front   7 sec  18\" surface  1 rep hands out front  X 5 total    Blazepods in soloste  14' line 1 min  4\" step 10\" tap    Walking forwards and backwards catching and tossing ball 2 min  Walking turn and catch bounced ball 2 min    Blazepods foam incline and turning, 1 min x 2 sets     Testing below    Blazepods in solostep  16 feet  With 2\" foam mat in between  No AD  1 min x 2 sets  __  1 min x 2 sets Step ups 6in, SPC.   -Lat X8 ea   -Fwd x8 ea    Speed walking, SPC, 100ft w/ turn x4 (best 29s)      Blaze pods spaced over 20ft no AD  90s x2    Blaze pods with cane 90 sec  - 16 hits  Blaze pods without cane 90 sec 2x8ft rectangle  - 13 hits    - 12 hit     40ft speed walking   - 21 sec   - 18 sec   - 16 sec  - 15 sec    Lateral step up to 6\" step about 8 ea side   Blazepods in soloste with cane:  -15' line  1 min x 2 sets   - sidestep over 6\" hurdles  1 min x 2 sets  - forwards / backwards  1 min x 2 sets    100 feet 10 lbs resistance   47 sec  47 sec  42 sec    100 feet as fast as possible  36 sec  34 sec    Lateral step up to 6\" step about 7-8 each with  Testing below    Blaze pods  14' line   5 pods  Random 1:00 min    100 feet 10 lbs resistance  52 sec  50 sec  47 sec   Overground ambulation no assisted device  100'    100'  x 2   20# resistance  1:27 fastest      Blaze pods  12' line   4 pods  Random 1:00  6 count    Added 4\" platform  5 count    + lob x 3 with solostep   Blazepods in solostep  4 count random  12' line  1:00 x 2   6, 9    Added 4\" step  1:00x 2 Blazepods in solostep  1 min x 2 sets  10' line    Blazepods in solostep  1 min x 2 sets  4\" steps  1 min x 2 sets    Standing heel raise  10 bilaterally  10 each side (L assisted by R)    Walk 50 feet resisted with 20 lbs, timed with feedback, best " "around 21 sec  X 4 sets           (Ther-ex) Strengthening             (There-ex) Stretching                                Mobility Measures 7/14/25 7/02/25 6/23/25 6/12/25   5 Time Sit to Stand  (17\" chair, arms across chest) 17.5 From 20\" surface completes 5 (arms across chest) in 15.5  sec    Completes 1 repetition with 2 attempts from 18\" surface From 20\" surface completes 5 (arms across chest) in 21.5 sec Unable from 18\" surface    From 20\" surface completes 2-3 in about 12 seconds   3 Meter Timed  Up & Go   11.1 sec  With cane 12.9 seconds  With cane   Walking speed (self-selected)       Functional Gait Assessment (see below)  [id number]  77754    12/30   24594  63432    9/30 22746  28857    8/30   2 Minute Walk Test (100 foot circular course)  2 minute walk test  With cane no solostep    300 feet 2 minute walk test   With cane in solostep    242 feet 2 minute walk test   With cane in solostep    206 feet    Ending heart rate 77 bpm  98% SpO2   Patient-Reported Outcome Measure: Activities-Specific Balance Confidence Scale (ABC Scale)  Not tested Not tested 32%       ASSESSMENT:  Patient s/p congestion end of last week lasting the weekend. Resting periods taken throughout session. Difficulty with challenged amb without assistive device.  He will continue to benefit from skilled services.      PLAN OF CARE:  Patient will benefit from physical therapy 2-3 times per week for 2 months  Neuromuscular re-education, therapeutic exercises, and therapeutic activities as outlined in grids.      Melly Shabazz, PT  7/14/2025    "

## 2025-07-16 ENCOUNTER — OFFICE VISIT (OUTPATIENT)
Facility: REHABILITATION | Age: 85
End: 2025-07-16
Attending: INTERNAL MEDICINE
Payer: MEDICARE

## 2025-07-16 DIAGNOSIS — Z74.09 IMPAIRED MOBILITY: Primary | ICD-10-CM

## 2025-07-16 DIAGNOSIS — R26.2 AMBULATORY DYSFUNCTION: ICD-10-CM

## 2025-07-16 PROCEDURE — 97110 THERAPEUTIC EXERCISES: CPT | Performed by: PHYSICAL THERAPIST

## 2025-07-16 PROCEDURE — 97112 NEUROMUSCULAR REEDUCATION: CPT | Performed by: PHYSICAL THERAPIST

## 2025-07-16 NOTE — PROGRESS NOTES
"PHYSICAL THERAPY TREATMENT     Date: 25  Name: Mario Tidwell  : 1940  Referring Provider: Sebastien Parker DO  AUTHORIZATION:   Insurance: Payor: MEDICARE / Plan: MEDICARE A AND B / Product Type: Medicare A & B Fee for Service /     HPI: Mario Tidwell is a 85 y.o. male referred to outpatient physical therapy for the following diagnosis   Encounter Diagnoses   Name Primary?    Impaired mobility Yes    Ambulatory dysfunction          Subjective: Feeling better from his congestion. Planning on going fishing and needs to negotiate steps    OBJECTIVE:  BP 90/50  HR 82  SPO2 95%    Precautions - pacemaker    Specialty Treatment Diary  25   Home exercise program Doing bands at home   Sit to stand  5 x 3          Reviewed HEP encouraged Sit to stand and timed ambulation to facilitate rehab progress Sit to stand 20\" surface  5 x 3 sets   (Ther-ex, neuromuscular re-ed)Walking/endurance Overground 150' x 2    Treadmill self paced .7mph 2 min  .9mph 2.5   Standing rest    .7 mph x 3 minutes      Treadmill 1.2 mph 4 min   Treadmill 1.3mph  -4min  (Stand rest break)  -3min  Treadmill intervals 4 min alternating   0.7mph 1 min   1.0mph 1 min           Treadmill   1.0 mph 1 min 11/20  1.2 mph 1 min 13/20   Treadmill ambulation  .7mph 4 min  Standing rest  .7mph 3 minutes     Treadmill .9 mph x3 min  Standing break  .8mph x 3 min 72 HR Treadmill 0.7 mph 2 min  0.7 mph 2 min  0.8 mph 2 min   (Neuromuscular re-ed) Static and dynamic balance/anticipatory / reactive Resisted 75 feet walking  Pull 20 lbs x 4 ST cane    Resisted 100 feet walking  Pull 20 lbs  No assistive device  Fastest 41      Blazepods in solostep  14' line   Random  4 count  6\" stepper  4\" step   1.5 min x 3 sets  12,9,8    Blazepods with solostep  Forwards / backwards 10' 1.5 min x 2 sets with cane  9,         Sidestep over 6\" hurdles  1 min x 2 sets with cane Resisted " "75 feet walking  Pull 20 lbs x 4 ST cane    26 sec  21 sec    Resisted 75 feet walking  Pull 20 lbs  No assistive device  35 sec  60 sec    5 x 2  18\" surface      Blazepods in solostep  14' line   Random  4 count  4\" step   1 min x 2 sets      Blazepods with solostep  Forwards / backwards 10' 1 min x 2 sets with cane  Sidestep over 6\" hurdles  1 min x 2 sets with cane   2MWT  285 feet  `  Blazepods in solostep  14' line   4\" step 10\" tap  1 min x 2 sets    Resisted 100 feet walking  Pull 20 lbs  No assistive device  35 sec  60 sec    20 lbs with cane  32 sec  32 sec    2 times sit to stand   18\" surface  4 sets    Blazepods with solostep  Forwards / backwards 10' 1 min x 2 sets with cane  Sidestep over 6\" hurdles  1 min x 2 sets with cane         2MWT  290 feet    100 feet pulling 20 lbs  37 sec  32 sec  33 sec  32 sec    3 times sit to stand 19\" surface  Hands out front   7 sec  18\" surface  1 rep hands out front  X 5 total    Blazepods in solostep  14' line 1 min  4\" step 10\" tap    Walking forwards and backwards catching and tossing ball 2 min  Walking turn and catch bounced ball 2 min    Blazepods foam incline and turning, 1 min x 2 sets     Testing below    Blazepods in solostep  16 feet  With 2\" foam mat in between  No AD  1 min x 2 sets  __  1 min x 2 sets Step ups 6in, SPC.   -Lat X8 ea   -Fwd x8 ea    Speed walking, SPC, 100ft w/ turn x4 (best 29s)      Blaze pods spaced over 20ft no AD  90s x2    Blaze pods with cane 90 sec  - 16 hits  Blaze pods without cane 90 sec 2x8ft rectangle  - 13 hits    - 12 hit     40ft speed walking   - 21 sec   - 18 sec   - 16 sec  - 15 sec    Lateral step up to 6\" step about 8 ea side   Blazepods in solostep with cane:  -15' line  1 min x 2 sets   - sidestep over 6\" hurdles  1 min x 2 sets  - forwards / backwards  1 min x 2 sets    100 feet 10 lbs resistance   47 sec  47 sec  42 sec    100 feet as fast as possible  36 sec  34 sec    Lateral step up to 6\" step about 7-8 each " "with  Testing below    Blaze pods  14' line   5 pods  Random 1:00 min    100 feet 10 lbs resistance  52 sec  50 sec  47 sec   Overground ambulation no assisted device  100'    100'  x 2   20# resistance  1:27 fastest      Blaze pods  12' line   4 pods  Random 1:00  6 count    Added 4\" platform  5 count    + lob x 3 with solostep   Blazepods in solostep  4 count random  12' line  1:00 x 2   6, 9    Added 4\" step  1:00x 2 Blazepods in solostep  1 min x 2 sets  10' line    Blazepods in solostep  1 min x 2 sets  4\" steps  1 min x 2 sets    Standing heel raise  10 bilaterally  10 each side (L assisted by R)    Walk 50 feet resisted with 20 lbs, timed with feedback, best around 21 sec  X 4 sets           (Ther-ex) Strengthening               (There-ex) Stretching                                    Mobility Measures 7/14/25 7/02/25 6/23/25 6/12/25   5 Time Sit to Stand  (17\" chair, arms across chest) 17.5 From 20\" surface completes 5 (arms across chest) in 15.5  sec    Completes 1 repetition with 2 attempts from 18\" surface From 20\" surface completes 5 (arms across chest) in 21.5 sec Unable from 18\" surface    From 20\" surface completes 2-3 in about 12 seconds   3 Meter Timed  Up & Go   11.1 sec  With cane 12.9 seconds  With cane   Walking speed (self-selected)       Functional Gait Assessment (see below)  37772  54056    12/30   52564  35575    9/30 47961  79394    8/30   2 Minute Walk Test (100 foot circular course)  2 minute walk test  With cane no solostep    300 feet 2 minute walk test   With cane in solostep    242 feet 2 minute walk test   With cane in solostep    206 feet    Ending heart rate 77 bpm  98% SpO2   Patient-Reported Outcome Measure: Activities-Specific Balance Confidence Scale (ABC Scale)  Not tested Not tested 32%       ASSESSMENT:  Patient with improving activity tolerance. Treadmill re-started self paced.  He will continue to benefit from skilled services.      PLAN OF CARE:  Patient will benefit " from physical therapy 2-3 times per week for 2 months  Neuromuscular re-education, therapeutic exercises, and therapeutic activities as outlined in grids.      Melly Shabazz, PT  7/16/2025

## 2025-07-18 ENCOUNTER — OFFICE VISIT (OUTPATIENT)
Facility: REHABILITATION | Age: 85
End: 2025-07-18
Attending: INTERNAL MEDICINE
Payer: MEDICARE

## 2025-07-18 DIAGNOSIS — Z74.09 IMPAIRED MOBILITY: Primary | ICD-10-CM

## 2025-07-18 DIAGNOSIS — N18.5 CHRONIC RENAL FAILURE, STAGE 5 (HCC): ICD-10-CM

## 2025-07-18 DIAGNOSIS — R26.2 AMBULATORY DYSFUNCTION: ICD-10-CM

## 2025-07-18 DIAGNOSIS — I50.20 HEART FAILURE WITH REDUCED EJECTION FRACTION (HFREF, <= 40%) (HCC): ICD-10-CM

## 2025-07-18 PROCEDURE — 97112 NEUROMUSCULAR REEDUCATION: CPT | Performed by: PHYSICAL THERAPIST

## 2025-07-18 NOTE — PROGRESS NOTES
"PHYSICAL THERAPY TREATMENT     Date: 25  Name: Mario Tidwell  : 1940  Referring Provider: Sebastien Parker DO  AUTHORIZATION:   Insurance: Payor: MEDICARE / Plan: MEDICARE A AND B / Product Type: Medicare A & B Fee for Service /     HPI: Mario Tidwell is a 85 y.o. male referred to outpatient physical therapy for the following diagnosis   Encounter Diagnoses   Name Primary?    Impaired mobility Yes    Ambulatory dysfunction     Heart failure with reduced ejection fraction (HFrEF, <= 40%) (HCC)     Chronic renal failure, stage 5 (HCC)            Subjective: I take a nebulizer as needed. I do it once a day. I just feel weak in my arms and legs. Ejection fraction 25%( severely below normal) I am getting another test . I am on Palliative care, not on Hospice because I don't want to change my doctors.    OBJECTIVE:  /60  HR 80  SPO2 97%    Precautions - pacemaker    Specialty Treatment Diary  25   Home exercise program  Doing bands at home   Sit to stand  5 x 3          Reviewed HEP encouraged Sit to stand and timed ambulation to facilitate rehab progress Sit to stand 20\" surface  5 x 3 sets   (Ther-ex, neuromuscular re-ed)Walking/endurance held Overground 150' x 2    Treadmill self paced .7mph 2 min  .9mph 2.5   Standing rest    .7 mph x 3 minutes      Treadmill 1.2 mph 4 min   Treadmill 1.3mph  -4min  (Stand rest break)  -3min  Treadmill intervals 4 min alternating   0.7mph 1 min   1.0mph 1 min           Treadmill   1.0 mph 1 min   1.2 mph 1 min /20   Treadmill ambulation  .7mph 4 min  Standing rest  .7mph 3 minutes     Treadmill .9 mph x3 min  Standing break  .8mph x 3 min 72 HR Treadmill 0.7 mph 2 min  0.7 mph 2 min  0.8 mph 2 min   (Neuromuscular re-ed) Static and dynamic balance/anticipatory / reactive Blazepods in solostep  14' line no cane  Random  4 count  6\" stepper  4\" compliant mat " "  1.5 min x 3 sets  10, 6, 6,6    Blazepods with solostep  Forwards / backwards 10' 1.5 min x 3  sets without cane    Resting time taken between activities    Overground ambulation 100'   Resisted 75 feet walking  Pull 20 lbs x 4 ST cane    Resisted 100 feet walking  Pull 20 lbs  No assistive device  Fastest 41      Blazepods in solostep  14' line   Random  4 count  6\" stepper  4\" step   1.5 min x 3 sets  12,9,8    Blazepods with solostep  Forwards / backwards 10' 1.5 min x 2 sets with cane          Sidestep over 6\" hurdles  1 min x 2 sets with cane Resisted 75 feet walking  Pull 20 lbs x 4 ST cane    26 sec  21 sec    Resisted 75 feet walking  Pull 20 lbs  No assistive device  35 sec  60 sec    5 x 2  18\" surface      Blazepods in soloste  14' line   Random  4 count  4\" step   1 min x 2 sets      Blazepods with solostep  Forwards / backwards 10' 1 min x 2 sets with cane  Sidestep over 6\" hurdles  1 min x 2 sets with cane   2MWT  285 feet  `  Blazepods in solostep  14' line   4\" step 10\" tap  1 min x 2 sets    Resisted 100 feet walking  Pull 20 lbs  No assistive device  35 sec  60 sec    20 lbs with cane  32 sec  32 sec    2 times sit to stand   18\" surface  4 sets    Blazepods with solostep  Forwards / backwards 10' 1 min x 2 sets with cane  Sidestep over 6\" hurdles  1 min x 2 sets with cane         2MWT  290 feet    100 feet pulling 20 lbs  37 sec  32 sec  33 sec  32 sec    3 times sit to stand 19\" surface  Hands out front   7 sec  18\" surface  1 rep hands out front  X 5 total    Blazepods in soloste  14' line 1 min  4\" step 10\" tap    Walking forwards and backwards catching and tossing ball 2 min  Walking turn and catch bounced ball 2 min    Blazepods foam incline and turning, 1 min x 2 sets     Testing below    Blazepods in solostep  16 feet  With 2\" foam mat in between  No AD  1 min x 2 sets  __  1 min x 2 sets Step ups 6in, SPC.   -Lat X8 ea   -Fwd x8 ea    Speed walking, SPC, 100ft w/ turn x4 (best " "29s)      Blaze pods spaced over 20ft no AD  90s x2    Blaze pods with cane 90 sec  - 16 hits  Blaze pods without cane 90 sec 2x8ft rectangle  - 13 hits    - 12 hit     40ft speed walking   - 21 sec   - 18 sec   - 16 sec  - 15 sec    Lateral step up to 6\" step about 8 ea side   Blazepods in solostep with cane:  -15' line  1 min x 2 sets   - sidestep over 6\" hurdles  1 min x 2 sets  - forwards / backwards  1 min x 2 sets    100 feet 10 lbs resistance   47 sec  47 sec  42 sec    100 feet as fast as possible  36 sec  34 sec    Lateral step up to 6\" step about 7-8 each with  Testing below    Blaze pods  14' line   5 pods  Random 1:00 min    100 feet 10 lbs resistance  52 sec  50 sec  47 sec   Overground ambulation no assisted device  100'    100'  x 2   20# resistance  1:27 fastest      Blaze pods  12' line   4 pods  Random 1:00  6 count    Added 4\" platform  5 count    + lob x 3 with solostep   Blazepods in solostep  4 count random  12' line  1:00 x 2   6, 9    Added 4\" step  1:00x 2 Blazepods in solostep  1 min x 2 sets  10' line    Blazepods in solostep  1 min x 2 sets  4\" steps  1 min x 2 sets    Standing heel raise  10 bilaterally  10 each side (L assisted by R)    Walk 50 feet resisted with 20 lbs, timed with feedback, best around 21 sec  X 4 sets           (Ther-ex) Strengthening Biceps curls sitting  2 x 10  Overhead push off  2x 10               (There-ex) Stretching                 Discussed managing salt, staying active to tolerance                     Mobility Measures 7/14/25 7/02/25 6/23/25 6/12/25   5 Time Sit to Stand  (17\" chair, arms across chest) 17.5 From 20\" surface completes 5 (arms across chest) in 15.5  sec    Completes 1 repetition with 2 attempts from 18\" surface From 20\" surface completes 5 (arms across chest) in 21.5 sec Unable from 18\" surface    From 20\" surface completes 2-3 in about 12 seconds   3 Meter Timed  Up & Go   11.1 sec  With cane 12.9 seconds  With cane   Walking speed " (self-selected)       Functional Gait Assessment (see below)  [id number]  [id number]    12/30   [id number]  05885    9/30 06230  67435    8/30   2 Minute Walk Test (100 foot circular course)  2 minute walk test  With cane no solostep    300 feet 2 minute walk test   With cane in solostep    242 feet 2 minute walk test   With cane in solostep    206 feet    Ending heart rate 77 bpm  98% SpO2   Patient-Reported Outcome Measure: Activities-Specific Balance Confidence Scale (ABC Scale)  Not tested Not tested 32%       ASSESSMENT:  Patient with decreased activity tolerance today. Resting periods required during session. Continues with congestion and use of nebulizer as needed. Discussed importance of activity and mobility as tolerated, self paced. He will continue to benefit from skilled services.      PLAN OF CARE:  Patient will benefit from physical therapy 2-3 times per week for 2 months  Neuromuscular re-education, therapeutic exercises, and therapeutic activities as outlined in grids.      Melly Shabazz, PT  7/18/2025

## 2025-07-21 ENCOUNTER — OFFICE VISIT (OUTPATIENT)
Facility: REHABILITATION | Age: 85
End: 2025-07-21
Attending: INTERNAL MEDICINE
Payer: MEDICARE

## 2025-07-21 DIAGNOSIS — R26.2 AMBULATORY DYSFUNCTION: ICD-10-CM

## 2025-07-21 DIAGNOSIS — Z74.09 IMPAIRED MOBILITY: Primary | ICD-10-CM

## 2025-07-21 DIAGNOSIS — I50.20 HEART FAILURE WITH REDUCED EJECTION FRACTION (HFREF, <= 40%) (HCC): ICD-10-CM

## 2025-07-21 PROCEDURE — 97112 NEUROMUSCULAR REEDUCATION: CPT | Performed by: PHYSICAL THERAPIST

## 2025-07-21 PROCEDURE — 97110 THERAPEUTIC EXERCISES: CPT | Performed by: PHYSICAL THERAPIST

## 2025-07-21 NOTE — PROGRESS NOTES
"PHYSICAL THERAPY TREATMENT / UPDATE    Date: 25  Name: Mario Tidwell  : 1940  Referring Provider: Sebastien Parker DO  AUTHORIZATION:   Insurance: Payor: MEDICARE / Plan: MEDICARE A AND B / Product Type: Medicare A & B Fee for Service /     HPI: Mario Tidwell is a 85 y.o. male referred to outpatient physical therapy for the following diagnosis   Encounter Diagnoses   Name Primary?    Impaired mobility Yes    Ambulatory dysfunction     Heart failure with reduced ejection fraction (HFrEF, <= 40%) ([facility])            Subjective:   Banged left elbow on the truck.  On increased dose of diuretic, lost 3 lbs.    OBJECTIVE:  /42    Precautions - pacemaker    Specialty Treatment Diary  25   Home exercise program   Doing bands at home   Sit to stand  5 x 3          Reviewed HEP encouraged Sit to stand and timed ambulation to facilitate rehab progress Sit to stand 20\" surface  5 x 3 sets   (Ther-ex, neuromuscular re-ed)Walking/endurance  held Overground 150' x 2    Treadmill self paced .7mph 2 min  .9mph 2.5   Standing rest    .7 mph x 3 minutes      Treadmill 1.2 mph 4 min   Treadmill 1.3mph  -4min  (Stand rest break)  -3min  Treadmill intervals 4 min alternating   0.7mph 1 min   1.0mph 1 min           Treadmill   1.0 mph 1 min /20  1.2 mph 1 min 13/20   Treadmill ambulation  .7mph 4 min  Standing rest  .7mph 3 minutes     Treadmill .9 mph x3 min  Standing break  .8mph x 3 min 72 HR Treadmill 0.7 mph 2 min  0.7 mph 2 min  0.8 mph 2 min   (Neuromuscular re-ed) Static and dynamic balance/anticipatory / reactive Blazepods in solostep no AD  Sidestep over 3\" x 3\" objects  1 min x 2 sets    Foam incline and atop 8\" step  1 min x 2 sets    Step up 6\" step about 5 each with cane  8\" step with cane about 5 each    SciFit level 2.3 5 min Blazepods in solostep  14' line no cane  Random  4 count  6\" stepper  4\" compliant " "mat   1.5 min x 3 sets  10, 6, 6,6    Blazepods with solostep  Forwards / backwards 10' 1.5 min x 3  sets without cane    Resting time taken between activities    Overground ambulation 100'   Resisted 75 feet walking  Pull 20 lbs x 4 ST cane    Resisted 100 feet walking  Pull 20 lbs  No assistive device  Fastest 41      Blazepods in soloste  14' line   Random  4 count  6\" stepper  4\" step   1.5 min x 3 sets  12,9,8    Blazepods with solostep  Forwards / backwards 10' 1.5 min x 2 sets with cane          Sidestep over 6\" hurdles  1 min x 2 sets with cane Resisted 75 feet walking  Pull 20 lbs x 4 ST cane    26 sec  21 sec    Resisted 75 feet walking  Pull 20 lbs  No assistive device  35 sec  60 sec    5 x 2  18\" surface      Blazepods in soloste  14' line   Random  4 count  4\" step   1 min x 2 sets      Blazepods with solostep  Forwards / backwards 10' 1 min x 2 sets with cane  Sidestep over 6\" hurdles  1 min x 2 sets with cane   2MWT  285 feet  `  Blazepods in solostep  14' line   4\" step 10\" tap  1 min x 2 sets    Resisted 100 feet walking  Pull 20 lbs  No assistive device  35 sec  60 sec    20 lbs with cane  32 sec  32 sec    2 times sit to stand   18\" surface  4 sets    Blazepods with solostep  Forwards / backwards 10' 1 min x 2 sets with cane  Sidestep over 6\" hurdles  1 min x 2 sets with cane         2MWT  290 feet    100 feet pulling 20 lbs  37 sec  32 sec  33 sec  32 sec    3 times sit to stand 19\" surface  Hands out front   7 sec  18\" surface  1 rep hands out front  X 5 total    Blazepods in soloste  14' line 1 min  4\" step 10\" tap    Walking forwards and backwards catching and tossing ball 2 min  Walking turn and catch bounced ball 2 min    Blazepods foam incline and turning, 1 min x 2 sets     Testing below    Blazepods in solostep  16 feet  With 2\" foam mat in between  No AD  1 min x 2 sets  __  1 min x 2 sets Step ups 6in, SPC.   -Lat X8 ea   -Fwd x8 ea    Speed walking, SPC, 100ft w/ turn x4 (best " "29s)      Blaze pods spaced over 20ft no AD  90s x2    Blaze pods with cane 90 sec  - 16 hits  Blaze pods without cane 90 sec 2x8ft rectangle  - 13 hits    - 12 hit     40ft speed walking   - 21 sec   - 18 sec   - 16 sec  - 15 sec    Lateral step up to 6\" step about 8 ea side   Blazepods in solostep with cane:  -15' line  1 min x 2 sets   - sidestep over 6\" hurdles  1 min x 2 sets  - forwards / backwards  1 min x 2 sets    100 feet 10 lbs resistance   47 sec  47 sec  42 sec    100 feet as fast as possible  36 sec  34 sec    Lateral step up to 6\" step about 7-8 each with  Testing below    Blaze pods  14' line   5 pods  Random 1:00 min    100 feet 10 lbs resistance  52 sec  50 sec  47 sec   Overground ambulation no assisted device  100'    100'  x 2   20# resistance  1:27 fastest      Blaze pods  12' line   4 pods  Random 1:00  6 count    Added 4\" platform  5 count    + lob x 3 with solostep   Blazepods in solostep  4 count random  12' line  1:00 x 2   6, 9    Added 4\" step  1:00x 2 Blazepods in solostep  1 min x 2 sets  10' line    Blazepods in solostep  1 min x 2 sets  4\" steps  1 min x 2 sets    Standing heel raise  10 bilaterally  10 each side (L assisted by R)    Walk 50 feet resisted with 20 lbs, timed with feedback, best around 21 sec  X 4 sets           (Ther-ex) Strengthening  Biceps curls sitting  2 x 10  Overhead push off  2x 10               (There-ex) Stretching                   Discussed managing salt, staying active to tolerance                     Mobility Measures 7/21/25 7/02/25 6/23/25 6/12/25   5 Time Sit to Stand  (17\" chair, arms across chest) 18\" surface  Hands out front or elbows to knees 23 seconds From 20\" surface completes 5 (arms across chest) in 15.5  sec    Completes 1 repetition with 2 attempts from 18\" surface From 20\" surface completes 5 (arms across chest) in 21.5 sec Unable from 18\" surface    From 20\" surface completes 2-3 in about 12 seconds   3 Meter Timed  Up & Go   11.1 " sec  With cane 12.9 seconds  With cane   Walking speed (self-selected)       Functional Gait Assessment (see below) 70383  20058    15/30 28556  91989    12/30   69368  71562    9/30 76508  21216    8/30   2 Minute Walk Test (100 foot circular course) 290 feet  With cane no solostep  2 minute walk test  With cane no solostep    300 feet 2 minute walk test   With cane in solostep    242 feet 2 minute walk test   With cane in solostep    206 feet    Ending heart rate 77 bpm  98% SpO2   Patient-Reported Outcome Measure: Activities-Specific Balance Confidence Scale (ABC Scale)  Not tested Not tested 32%       ASSESSMENT:  Good improvement in dynamic balance and functional leg strength.  Continued limitation in walking endurance.  Recommend continued physical therapy to maximize mobility.     PLAN OF CARE:  Patient will benefit from physical therapy 2-3 times per week for 2 months  Neuromuscular re-education, therapeutic exercises, and therapeutic activities as outlined in grids.      Jhonny Carrington, PT  7/21/2025

## 2025-07-21 NOTE — LETTER
2025    Sebastien Parker DO  [address]  [address]  [place] [facility] [id number]    Patient: Mario Tidwell   YOB: 1940   Date of Visit: 2025     Encounter Diagnosis     ICD-10-CM    1. Impaired mobility  Z74.09       2. Ambulatory dysfunction  R26.2       3. Heart failure with reduced ejection fraction (HFrEF, <= 40%) ([facility])  I50.20           Dear Dr. Sebastien Parker, :    Thank you for your recent referral of Mario Tidwell. Please review the attached evaluation summary from Mario's recent visit.     Please verify that you agree with the plan of care by signing the attached order.     If you have any questions or concerns, please do not hesitate to call.     I sincerely appreciate the opportunity to share in the care of one of your patients and hope to have another opportunity to work with you in the near future.       Sincerely,    Jhonny Carrington, PT      Referring Provider:      I certify that I have read the below Plan of Care and certify the need for these services furnished under this plan of treatment while under my care.                    Sebastien Parker DO  [address]  [address]  [place] [facility] [address]  Via Fax: [phone]          PHYSICAL THERAPY TREATMENT / UPDATE    Date: 25  Name: Mario Tidwell  : 1940  Referring Provider: Sebastien Parker DO  AUTHORIZATION:   Insurance: Payor: MEDICARE / Plan: MEDICARE A AND B / Product Type: Medicare A & B Fee for Service /     HPI: Mario Tidwell is a 85 y.o. male referred to outpatient physical therapy for the following diagnosis   Encounter Diagnoses   Name Primary?   • Impaired mobility Yes   • Ambulatory dysfunction    • Heart failure with reduced ejection fraction (HFrEF, <= 40%) ([facility])            Subjective:   Banged left elbow on the truck.  On increased dose of diuretic, lost 3 lbs.    OBJECTIVE:  /42    Precautions - pacemaker    Specialty Treatment Diary  25  "7/02/25 6/30 6/27/25 6/25/25 6/23/25 6/20/25 6/18/25 6/16/25   Home exercise program   Doing bands at home   Sit to stand  5 x 3          Reviewed HEP encouraged Sit to stand and timed ambulation to facilitate rehab progress Sit to stand 20\" surface  5 x 3 sets   (Ther-ex, neuromuscular re-ed)Walking/endurance  held Overground 150' x 2    Treadmill self paced .7mph 2 min  .9mph 2.5   Standing rest    .7 mph x 3 minutes      Treadmill 1.2 mph 4 min   Treadmill 1.3mph  -4min  (Stand rest break)  -3min  Treadmill intervals 4 min alternating   0.7mph 1 min   1.0mph 1 min           Treadmill   1.0 mph 1 min 11/20  1.2 mph 1 min 13/20   Treadmill ambulation  .7mph 4 min  Standing rest  .7mph 3 minutes     Treadmill .9 mph x3 min  Standing break  .8mph x 3 min 72 HR Treadmill 0.7 mph 2 min  0.7 mph 2 min  0.8 mph 2 min   (Neuromuscular re-ed) Static and dynamic balance/anticipatory / reactive Blazepods in [facility] no AD  Sidestep over 3\" x 3\" objects  1 min x 2 sets    Foam incline and atop 8\" step  1 min x 2 sets    Step up 6\" step about 5 each with cane  8\" step with cane about 5 each    SciFit level 2.3 5 min Blazepods in [facility]  14' line no cane  Random  4 count  6\" stepper  4\" compliant mat   1.5 min x 3 sets  10, 6, 6,6    Blazepods with solostep  Forwards / backwards 10' 1.5 min x 3  sets without cane    Resting time taken between activities    Overground ambulation 100'   Resisted 75 feet walking  Pull 20 lbs x 4 ST cane    Resisted 100 feet walking  Pull 20 lbs  No assistive device  Fastest 41      Blazepods in soloste  14' line   Random  4 count  6\" stepper  4\" step   1.5 min x 3 sets  12,9,8    Blazepods with solostep  Forwards / backwards 10' 1.5 min x 2 sets with cane          Sidestep over 6\" hurdles  1 min x 2 sets with cane Resisted 75 feet walking  Pull 20 lbs x 4 ST cane    26 sec  21 sec    Resisted 75 feet walking  Pull 20 lbs  No assistive device  35 sec  60 sec    5 x 2  18\" surface      Blazepods " "in solostep  14' line   Random  4 count  4\" step   1 min x 2 sets      Blazepods with solostep  Forwards / backwards 10' 1 min x 2 sets with cane  Sidestep over 6\" hurdles  1 min x 2 sets with cane   2MWT  285 feet  `  Blazepods in solostep  14' line   4\" step 10\" tap  1 min x 2 sets    Resisted 100 feet walking  Pull 20 lbs  No assistive device  35 sec  60 sec    20 lbs with cane  32 sec  32 sec    2 times sit to stand   18\" surface  4 sets    Blazepods with solostep  Forwards / backwards 10' 1 min x 2 sets with cane  Sidestep over 6\" hurdles  1 min x 2 sets with cane         2MWT  290 feet    100 feet pulling 20 lbs  37 sec  32 sec  33 sec  32 sec    3 times sit to stand 19\" surface  Hands out front   7 sec  18\" surface  1 rep hands out front  X 5 total    Blazepods in solostep  14' line 1 min  4\" step 10\" tap    Walking forwards and backwards catching and tossing ball 2 min  Walking turn and catch bounced ball 2 min    Blazepods foam incline and turning, 1 min x 2 sets     Testing below    Blazepods in solostep  16 feet  With 2\" foam mat in between  No AD  1 min x 2 sets  __  1 min x 2 sets Step ups 6in, SPC.   -Lat X8 ea   -Fwd x8 ea    Speed walking, SPC, 100ft w/ turn x4 (best 29s)      Blaze pods spaced over 20ft no AD  90s x2    Blaze pods with cane 90 sec  - 16 hits  Blaze pods without cane 90 sec 2x8ft rectangle  - 13 hits    - 12 hit     40ft speed walking   - 21 sec   - 18 sec   - 16 sec  - 15 sec    Lateral step up to 6\" step about 8 ea side   Blazepods in solostep with cane:  -15' line  1 min x 2 sets   - sidestep over 6\" hurdles  1 min x 2 sets  - forwards / backwards  1 min x 2 sets    100 feet 10 lbs resistance   47 sec  47 sec  42 sec    100 feet as fast as possible  36 sec  34 sec    Lateral step up to 6\" step about 7-8 each with  Testing below    Blaze pods  14' line   5 pods  Random 1:00 min    100 feet 10 lbs resistance  52 sec  50 sec  47 sec   Overground ambulation no assisted " "device  100'    100'  x 2   20# resistance  1:27 fastest      Blaze pods  12' line   4 pods  Random 1:00  6 count    Added 4\" platform  5 count    + lob x 3 with solostep   Blazepods in solostep  4 count random  12' line  1:00 x 2   6, 9    Added 4\" step  1:00x 2 Blazepods in solostep  1 min x 2 sets  10' line    Blazepods in solostep  1 min x 2 sets  4\" steps  1 min x 2 sets    Standing heel raise  10 bilaterally  10 each side (L assisted by R)    Walk 50 feet resisted with 20 lbs, timed with feedback, best around 21 sec  X 4 sets           (Ther-ex) Strengthening  Biceps curls sitting  2 x 10  Overhead push off  2x 10               (There-ex) Stretching                   Discussed managing salt, staying active to tolerance                     Mobility Measures 7/21/25 7/02/25 6/23/25 6/12/25   5 Time Sit to Stand  (17\" chair, arms across chest) 18\" surface  Hands out front or elbows to knees 23 seconds From 20\" surface completes 5 (arms across chest) in 15.5  sec    Completes 1 repetition with 2 attempts from 18\" surface From 20\" surface completes 5 (arms across chest) in 21.5 sec Unable from 18\" surface    From 20\" surface completes 2-3 in about 12 seconds   3 Meter Timed  Up & Go   11.1 sec  With cane 12.9 seconds  With cane   Walking speed (self-selected)       Functional Gait Assessment (see below) [id number]  82200    15/30 91937  87430    12/30   42298  69826    9/30 58998  81288    8/30   2 Minute Walk Test (100 foot circular course) 290 feet  With cane no solostep  2 minute walk test  With cane no solostep    300 feet 2 minute walk test   With cane in solostep    242 feet 2 minute walk test   With cane in solostep    206 feet    Ending heart rate 77 bpm  98% SpO2   Patient-Reported Outcome Measure: Activities-Specific Balance Confidence Scale (ABC Scale)  Not tested Not tested 32%       ASSESSMENT:  Good improvement in dynamic balance and functional leg strength.  Continued limitation in walking " endurance.  Recommend continued physical therapy to maximize mobility.     PLAN OF CARE:  Patient will benefit from physical therapy 2-3 times per week for 2 months  Neuromuscular re-education, therapeutic exercises, and therapeutic activities as outlined in grids.      Jhonny Carrington, PT  7/21/2025

## 2025-07-23 ENCOUNTER — OFFICE VISIT (OUTPATIENT)
Facility: REHABILITATION | Age: 85
End: 2025-07-23
Attending: INTERNAL MEDICINE
Payer: MEDICARE

## 2025-07-23 DIAGNOSIS — R26.2 AMBULATORY DYSFUNCTION: ICD-10-CM

## 2025-07-23 DIAGNOSIS — Z74.09 IMPAIRED MOBILITY: Primary | ICD-10-CM

## 2025-07-23 DIAGNOSIS — I50.20 HEART FAILURE WITH REDUCED EJECTION FRACTION (HFREF, <= 40%) (HCC): ICD-10-CM

## 2025-07-23 PROCEDURE — 97112 NEUROMUSCULAR REEDUCATION: CPT | Performed by: PHYSICAL THERAPIST

## 2025-07-23 PROCEDURE — 97110 THERAPEUTIC EXERCISES: CPT | Performed by: PHYSICAL THERAPIST

## 2025-07-23 NOTE — PROGRESS NOTES
"PHYSICAL THERAPY TREATMENT    Date: 25  Name: Mario Tidwell  : 1940  Referring Provider: Sebastien Parker DO  AUTHORIZATION:   Insurance: Payor: MEDICARE / Plan: MEDICARE A AND B / Product Type: Medicare A & B Fee for Service /     HPI: Mario Tidwell is a 85 y.o. male referred to outpatient physical therapy for the following diagnosis   Encounter Diagnoses   Name Primary?    Impaired mobility Yes    Ambulatory dysfunction     Heart failure with reduced ejection fraction (HFrEF, <= 40%) ([facility])            Subjective:   Legs a bit stiff.    OBJECTIVE:    Precautions - pacemaker    Specialty Treatment Diary  25   Home exercise program    Doing bands at home   Sit to stand  5 x 3          Reviewed HEP encouraged Sit to stand and timed ambulation to facilitate rehab progress Sit to stand 20\" surface  5 x 3 sets   (Ther-ex, neuromuscular re-ed)Walking/endurance   held Overground 150' x 2    Treadmill self paced .7mph 2 min  .9mph 2.5   Standing rest    .7 mph x 3 minutes      Treadmill 1.2 mph 4 min   Treadmill 1.3mph  -4min  (Stand rest break)  -3min  Treadmill intervals 4 min alternating   0.7mph 1 min   1.0mph 1 min           Treadmill   1.0 mph 1 min 11/20  1.2 mph 1 min 13/20   Treadmill ambulation  .7mph 4 min  Standing rest  .7mph 3 minutes     Treadmill .9 mph x3 min  Standing break  .8mph x 3 min 72 HR Treadmill 0.7 mph 2 min  0.7 mph 2 min  0.8 mph 2 min   (Neuromuscular re-ed) Static and dynamic balance/anticipatory / reactive 4 minute walk test 440 feet with cane    5 times sit to stand 18\" chair arms out front   22 sec  23 sec    Forwards / backwards blazepods in solostep  1 min x 2 sets  Sidestep over 6\" hurdles with cane,   1 min x 2 sets    Step up and back 6\" step  40 sec x 2  Up and down 4\" and 6\" steps blazepods in solostep    Step up and back 6\" step 40 sec  + cane 40 sec    SciFit " "level 1, 87 spm, 10 min    100 feet pull 20 lbs  42 sec  X 2 sets Blazepods in solostep no AD  Sidestep over 3\" x 3\" objects  1 min x 2 sets    Foam incline and atop 8\" step  1 min x 2 sets    Step up 6\" step about 5 each with cane  8\" step with cane about 5 each    SciFit level 2.3 5 min Blazepods in solostep  14' line no cane  Random  4 count  6\" stepper  4\" compliant mat   1.5 min x 3 sets  10, 6, 6,6    Blazepods with solostep  Forwards / backwards 10' 1.5 min x 3  sets without cane    Resting time taken between activities    Overground ambulation 100'   Resisted 75 feet walking  Pull 20 lbs x 4 ST cane    Resisted 100 feet walking  Pull 20 lbs  No assistive device  Fastest 41      Blazepods in solostep  14' line   Random  4 count  6\" stepper  4\" step   1.5 min x 3 sets  12,9,8    Blazepods with solostep  Forwards / backwards 10' 1.5 min x 2 sets with cane          Sidestep over 6\" hurdles  1 min x 2 sets with cane Resisted 75 feet walking  Pull 20 lbs x 4 ST cane    26 sec  21 sec    Resisted 75 feet walking  Pull 20 lbs  No assistive device  35 sec  60 sec    5 x 2  18\" surface      Blazepods in solostep  14' line   Random  4 count  4\" step   1 min x 2 sets      Blazepods with solostep  Forwards / backwards 10' 1 min x 2 sets with cane  Sidestep over 6\" hurdles  1 min x 2 sets with cane   2MWT  285 feet  `  Blazepods in solostep  14' line   4\" step 10\" tap  1 min x 2 sets    Resisted 100 feet walking  Pull 20 lbs  No assistive device  35 sec  60 sec    20 lbs with cane  32 sec  32 sec    2 times sit to stand   18\" surface  4 sets    Blazepods with solostep  Forwards / backwards 10' 1 min x 2 sets with cane  Sidestep over 6\" hurdles  1 min x 2 sets with cane         2MWT  290 feet    100 feet pulling 20 lbs  37 sec  32 sec  33 sec  32 sec    3 times sit to stand 19\" surface  Hands out front   7 sec  18\" surface  1 rep hands out front  X 5 total    Blazepods in solostep  14' line 1 min  4\" step 10\" " "tap    Walking forwards and backwards catching and tossing ball 2 min  Walking turn and catch bounced ball 2 min    Blazepods foam incline and turning, 1 min x 2 sets     Testing below    Blazepods in solostep  16 feet  With 2\" foam mat in between  No AD  1 min x 2 sets  __  1 min x 2 sets Step ups 6in, SPC.   -Lat X8 ea   -Fwd x8 ea    Speed walking, SPC, 100ft w/ turn x4 (best 29s)      Blaze pods spaced over 20ft no AD  90s x2    Blaze pods with cane 90 sec  - 16 hits  Blaze pods without cane 90 sec 2x8ft rectangle  - 13 hits    - 12 hit     40ft speed walking   - 21 sec   - 18 sec   - 16 sec  - 15 sec    Lateral step up to 6\" step about 8 ea side   Blazepods in solostep with cane:  -15' line  1 min x 2 sets   - sidestep over 6\" hurdles  1 min x 2 sets  - forwards / backwards  1 min x 2 sets    100 feet 10 lbs resistance   47 sec  47 sec  42 sec    100 feet as fast as possible  36 sec  34 sec    Lateral step up to 6\" step about 7-8 each with  Testing below    Blaze pods  14' line   5 pods  Random 1:00 min    100 feet 10 lbs resistance  52 sec  50 sec  47 sec   Overground ambulation no assisted device  100'    100'  x 2   20# resistance  1:27 fastest      Blaze pods  12' line   4 pods  Random 1:00  6 count    Added 4\" platform  5 count    + lob x 3 with solostep   Blazepods in solostep  4 count random  12' line  1:00 x 2   6, 9    Added 4\" step  1:00x 2 Blazepods in solostep  1 min x 2 sets  10' line    Blazepods in solostep  1 min x 2 sets  4\" steps  1 min x 2 sets    Standing heel raise  10 bilaterally  10 each side (L assisted by R)    Walk 50 feet resisted with 20 lbs, timed with feedback, best around 21 sec  X 4 sets           (Ther-ex) Strengthening   Biceps curls sitting  2 x 10  Overhead push off  2x 10               (There-ex) Stretching                     Discussed managing salt, staying active to tolerance                     Mobility Measures 7/21/25 7/02/25 6/23/25 6/12/25   5 Time Sit to Stand  (17\" " "chair, arms across chest) 18\" surface  Hands out front or elbows to knees 23 seconds From 20\" surface completes 5 (arms across chest) in 15.5  sec    Completes 1 repetition with 2 attempts from 18\" surface From 20\" surface completes 5 (arms across chest) in 21.5 sec Unable from 18\" surface    From 20\" surface completes 2-3 in about 12 seconds   3 Meter Timed  Up & Go   11.1 sec  With cane 12.9 seconds  With cane   Walking speed (self-selected)       Functional Gait Assessment (see below) [id number]  [id number]    15/30 [id number]  [id number]    12/30   [id number]  10931    9/30 [id number]  [id number]    8/30   2 Minute Walk Test (100 foot circular course) 290 feet  With cane no solostep  2 minute walk test  With cane no solostep    300 feet 2 minute walk test   With cane in solostep    242 feet 2 minute walk test   With cane in solostep    206 feet    Ending heart rate 77 bpm  98% SpO2   Patient-Reported Outcome Measure: Activities-Specific Balance Confidence Scale (ABC Scale)  Not tested Not tested 32%       ASSESSMENT:  Good improvement in dynamic balance and functional leg strength.  Getting up from standard chair consistently faster without use of arms.    Continued limitation in walking endurance.  Recommend continued physical therapy to maximize mobility.     PLAN OF CARE:  Patient will benefit from physical therapy 2-3 times per week for 2 months  Neuromuscular re-education, therapeutic exercises, and therapeutic activities as outlined in grids.      Jhonny Carrington, PT  7/23/2025    "

## 2025-07-25 ENCOUNTER — OFFICE VISIT (OUTPATIENT)
Facility: REHABILITATION | Age: 85
End: 2025-07-25
Attending: INTERNAL MEDICINE
Payer: MEDICARE

## 2025-07-25 DIAGNOSIS — I50.20 HEART FAILURE WITH REDUCED EJECTION FRACTION (HFREF, <= 40%) (HCC): ICD-10-CM

## 2025-07-25 DIAGNOSIS — R26.2 AMBULATORY DYSFUNCTION: ICD-10-CM

## 2025-07-25 DIAGNOSIS — Z74.09 IMPAIRED MOBILITY: Primary | ICD-10-CM

## 2025-07-25 PROCEDURE — 97110 THERAPEUTIC EXERCISES: CPT

## 2025-07-25 PROCEDURE — 97112 NEUROMUSCULAR REEDUCATION: CPT

## 2025-07-25 NOTE — PROGRESS NOTES
"PHYSICAL THERAPY TREATMENT    Date: 25  Name: Mario Tidwell  : 1940  Referring Provider: Sebastien Parker DO  AUTHORIZATION:   Insurance: Payor: MEDICARE / Plan: MEDICARE A AND B / Product Type: Medicare A & B Fee for Service /     HPI: Mario Tidwell is a 85 y.o. male referred to outpatient physical therapy for the following diagnosis   Encounter Diagnoses   Name Primary?    Impaired mobility Yes    Ambulatory dysfunction     Heart failure with reduced ejection fraction (HFrEF, <= 40%) ([facility])            Subjective:   Pt w/ nothing new to report.       OBJECTIVE:    Precautions - pacemaker    Specialty Treatment Diary  25   Home exercise program     Doing bands at home   Sit to stand  5 x 3          Reviewed HEP encouraged Sit to stand and timed ambulation to facilitate rehab progress Sit to stand 20\" surface  5 x 3 sets   (Ther-ex, neuromuscular re-ed)Walking/endurance    held Overground 150' x 2    Treadmill self paced .7mph 2 min  .9mph 2.5   Standing rest    .7 mph x 3 minutes      Treadmill 1.2 mph 4 min   Treadmill 1.3mph  -4min  (Stand rest break)  -3min  Treadmill intervals 4 min alternating   0.7mph 1 min   1.0mph 1 min           Treadmill   1.0 mph 1 min 11/20  1.2 mph 1 min 13/20   Treadmill ambulation  .7mph 4 min  Standing rest  .7mph 3 minutes     Treadmill .9 mph x3 min  Standing break  .8mph x 3 min 72 HR Treadmill 0.7 mph 2 min  0.7 mph 2 min  0.8 mph 2 min   (Neuromuscular re-ed) Static and dynamic balance/anticipatory / reactive SOLO t/o:   TM 1.2 mph 2% incline 5 min     Mystery mat w/ SPC (3 foam beams)   W/ blazepods 2x60\" (30 sec rest)  Seated rest   2x60\" (30\" rest)    20# AW drag w/ SPC 3/4 track x2 laps     10# tidal ball 1/2 track x1 lap    SciFit L2 5min rest 2min 4 minute walk test 440 feet with cane    5 times sit to stand 18\" chair arms out front   22 sec  23 " "sec    Forwards / backwards blazepods in solostep  1 min x 2 sets  Sidestep over 6\" hurdles with cane,   1 min x 2 sets    Step up and back 6\" step  40 sec x 2  Up and down 4\" and 6\" steps blazepods in solostep    Step up and back 6\" step 40 sec  + cane 40 sec    SciFit level 1, 87 spm, 10 min    100 feet pull 20 lbs  42 sec  X 2 sets Blazepods in solostep no AD  Sidestep over 3\" x 3\" objects  1 min x 2 sets    Foam incline and atop 8\" step  1 min x 2 sets    Step up 6\" step about 5 each with cane  8\" step with cane about 5 each    SciFit level 2.3 5 min Blazepods in soloste  14' line no cane  Random  4 count  6\" stepper  4\" compliant mat   1.5 min x 3 sets  10, 6, 6,6    Blazepods with solostep  Forwards / backwards 10' 1.5 min x 3  sets without cane    Resting time taken between activities    Overground ambulation 100'   Resisted 75 feet walking  Pull 20 lbs x 4 ST cane    Resisted 100 feet walking  Pull 20 lbs  No assistive device  Fastest 41      Blazepods in soloste  14' line   Random  4 count  6\" stepper  4\" step   1.5 min x 3 sets  12,9,8    Blazepods with solostep  Forwards / backwards 10' 1.5 min x 2 sets with cane          Sidestep over 6\" hurdles  1 min x 2 sets with cane Resisted 75 feet walking  Pull 20 lbs x 4 ST cane    26 sec  21 sec    Resisted 75 feet walking  Pull 20 lbs  No assistive device  35 sec  60 sec    5 x 2  18\" surface      Blazepods in solostep  14' line   Random  4 count  4\" step   1 min x 2 sets      Blazepods with solostep  Forwards / backwards 10' 1 min x 2 sets with cane  Sidestep over 6\" hurdles  1 min x 2 sets with cane   2MWT  285 feet  `  Blazepods in solostep  14' line   4\" step 10\" tap  1 min x 2 sets    Resisted 100 feet walking  Pull 20 lbs  No assistive device  35 sec  60 sec    20 lbs with cane  32 sec  32 sec    2 times sit to stand   18\" surface  4 sets    Blazepods with solostep  Forwards / backwards 10' 1 min x 2 sets with cane  Sidestep over 6\" hurdles  1 min x 2 " "sets with cane         2MWT  290 feet    100 feet pulling 20 lbs  37 sec  32 sec  33 sec  32 sec    3 times sit to stand 19\" surface  Hands out front   7 sec  18\" surface  1 rep hands out front  X 5 total    Blazepods in solostep  14' line 1 min  4\" step 10\" tap    Walking forwards and backwards catching and tossing ball 2 min  Walking turn and catch bounced ball 2 min    Blazepods foam incline and turning, 1 min x 2 sets     Testing below    Blazepods in solostep  16 feet  With 2\" foam mat in between  No AD  1 min x 2 sets  __  1 min x 2 sets Step ups 6in, SPC.   -Lat X8 ea   -Fwd x8 ea    Speed walking, SPC, 100ft w/ turn x4 (best 29s)      Blaze pods spaced over 20ft no AD  90s x2    Blaze pods with cane 90 sec  - 16 hits  Blaze pods without cane 90 sec 2x8ft rectangle  - 13 hits    - 12 hit     40ft speed walking   - 21 sec   - 18 sec   - 16 sec  - 15 sec    Lateral step up to 6\" step about 8 ea side   Blazepods in solostep with cane:  -15' line  1 min x 2 sets   - sidestep over 6\" hurdles  1 min x 2 sets  - forwards / backwards  1 min x 2 sets    100 feet 10 lbs resistance   47 sec  47 sec  42 sec    100 feet as fast as possible  36 sec  34 sec    Lateral step up to 6\" step about 7-8 each with  Testing below    Blaze pods  14' line   5 pods  Random 1:00 min    100 feet 10 lbs resistance  52 sec  50 sec  47 sec   Overground ambulation no assisted device  100'    100'  x 2   20# resistance  1:27 fastest      Blaze pods  12' line   4 pods  Random 1:00  6 count    Added 4\" platform  5 count    + lob x 3 with solostep   Blazepods in solostep  4 count random  12' line  1:00 x 2   6, 9    Added 4\" step  1:00x 2 Blazepods in solostep  1 min x 2 sets  10' line    Blazepods in solostep  1 min x 2 sets  4\" steps  1 min x 2 sets    Standing heel raise  10 bilaterally  10 each side (L assisted by R)    Walk 50 feet resisted with 20 lbs, timed with feedback, best around 21 sec  X 4 sets           (Ther-ex) Strengthening    " "Biceps curls sitting  2 x 10  Overhead push off  2x 10               (There-ex) Stretching                       Discussed managing salt, staying active to tolerance                     Mobility Measures 7/21/25 7/02/25 6/23/25 6/12/25   5 Time Sit to Stand  (17\" chair, arms across chest) 18\" surface  Hands out front or elbows to knees 23 seconds From 20\" surface completes 5 (arms across chest) in 15.5  sec    Completes 1 repetition with 2 attempts from 18\" surface From 20\" surface completes 5 (arms across chest) in 21.5 sec Unable from 18\" surface    From 20\" surface completes 2-3 in about 12 seconds   3 Meter Timed  Up & Go   11.1 sec  With cane 12.9 seconds  With cane   Walking speed (self-selected)       Functional Gait Assessment (see below) [id number]  [id number]    15/30 25146  47256    12/30   53071  17895    9/30 38156  91589    8/30   2 Minute Walk Test (100 foot circular course) 290 feet  With cane no solostep  2 minute walk test  With cane no solostep    300 feet 2 minute walk test   With cane in solostep    242 feet 2 minute walk test   With cane in solostep    206 feet    Ending heart rate 77 bpm  98% SpO2   Patient-Reported Outcome Measure: Activities-Specific Balance Confidence Scale (ABC Scale)  Not tested Not tested 32%       ASSESSMENT:  Good improvement in dynamic balance and functional leg strength.  Getting up from standard chair consistently faster without use of arms.    Continued limitation in walking endurance. Seated rest breaks required t/o. Challenged by addition mystery mat today.  Recommend continued physical therapy to maximize mobility.     PLAN OF CARE:  Patient will benefit from physical therapy 2-3 times per week for 2 months  Neuromuscular re-education, therapeutic exercises, and therapeutic activities as outlined in grids.      Citlali Rivero, PT  7/25/2025    "

## 2025-07-28 ENCOUNTER — APPOINTMENT (OUTPATIENT)
Facility: REHABILITATION | Age: 85
End: 2025-07-28
Attending: INTERNAL MEDICINE
Payer: MEDICARE

## 2025-07-29 ENCOUNTER — OFFICE VISIT (OUTPATIENT)
Facility: REHABILITATION | Age: 85
End: 2025-07-29
Attending: INTERNAL MEDICINE
Payer: MEDICARE

## 2025-07-29 DIAGNOSIS — R26.2 AMBULATORY DYSFUNCTION: ICD-10-CM

## 2025-07-29 DIAGNOSIS — N18.5 CHRONIC RENAL FAILURE, STAGE 5 (HCC): ICD-10-CM

## 2025-07-29 DIAGNOSIS — Z74.09 IMPAIRED MOBILITY: Primary | ICD-10-CM

## 2025-07-29 DIAGNOSIS — I50.20 HEART FAILURE WITH REDUCED EJECTION FRACTION (HFREF, <= 40%) (HCC): ICD-10-CM

## 2025-07-29 PROCEDURE — 97112 NEUROMUSCULAR REEDUCATION: CPT

## 2025-07-30 ENCOUNTER — OFFICE VISIT (OUTPATIENT)
Facility: REHABILITATION | Age: 85
End: 2025-07-30
Attending: INTERNAL MEDICINE
Payer: MEDICARE

## 2025-07-30 DIAGNOSIS — I50.20 HEART FAILURE WITH REDUCED EJECTION FRACTION (HFREF, <= 40%) (HCC): ICD-10-CM

## 2025-07-30 DIAGNOSIS — N18.5 CHRONIC RENAL FAILURE, STAGE 5 (HCC): ICD-10-CM

## 2025-07-30 DIAGNOSIS — R26.2 AMBULATORY DYSFUNCTION: ICD-10-CM

## 2025-07-30 DIAGNOSIS — Z74.09 IMPAIRED MOBILITY: Primary | ICD-10-CM

## 2025-07-30 PROCEDURE — 97112 NEUROMUSCULAR REEDUCATION: CPT

## 2025-07-30 PROCEDURE — 97110 THERAPEUTIC EXERCISES: CPT

## 2025-08-01 ENCOUNTER — EVALUATION (OUTPATIENT)
Facility: REHABILITATION | Age: 85
End: 2025-08-01
Attending: INTERNAL MEDICINE
Payer: MEDICARE

## 2025-08-01 DIAGNOSIS — I50.20 HEART FAILURE WITH REDUCED EJECTION FRACTION (HFREF, <= 40%) (HCC): ICD-10-CM

## 2025-08-01 DIAGNOSIS — R26.2 AMBULATORY DYSFUNCTION: ICD-10-CM

## 2025-08-01 DIAGNOSIS — N18.5 CHRONIC RENAL FAILURE, STAGE 5 (HCC): ICD-10-CM

## 2025-08-01 DIAGNOSIS — Z74.09 IMPAIRED MOBILITY: Primary | ICD-10-CM

## 2025-08-01 PROCEDURE — 97112 NEUROMUSCULAR REEDUCATION: CPT | Performed by: PHYSICAL THERAPIST

## 2025-08-01 PROCEDURE — 97110 THERAPEUTIC EXERCISES: CPT | Performed by: PHYSICAL THERAPIST

## 2025-08-06 ENCOUNTER — OFFICE VISIT (OUTPATIENT)
Facility: REHABILITATION | Age: 85
End: 2025-08-06
Attending: INTERNAL MEDICINE
Payer: MEDICARE

## 2025-08-06 DIAGNOSIS — I50.20 HEART FAILURE WITH REDUCED EJECTION FRACTION (HFREF, <= 40%) (HCC): ICD-10-CM

## 2025-08-06 DIAGNOSIS — N18.5 CHRONIC RENAL FAILURE, STAGE 5 (HCC): ICD-10-CM

## 2025-08-06 DIAGNOSIS — Z74.09 IMPAIRED MOBILITY: Primary | ICD-10-CM

## 2025-08-06 DIAGNOSIS — R26.2 AMBULATORY DYSFUNCTION: ICD-10-CM

## 2025-08-06 PROCEDURE — 97110 THERAPEUTIC EXERCISES: CPT | Performed by: PHYSICAL THERAPIST

## 2025-08-06 PROCEDURE — 97112 NEUROMUSCULAR REEDUCATION: CPT | Performed by: PHYSICAL THERAPIST

## 2025-08-07 ENCOUNTER — OFFICE VISIT (OUTPATIENT)
Facility: REHABILITATION | Age: 85
End: 2025-08-07
Attending: INTERNAL MEDICINE
Payer: MEDICARE

## 2025-08-07 DIAGNOSIS — N18.5 CHRONIC RENAL FAILURE, STAGE 5 (HCC): ICD-10-CM

## 2025-08-07 DIAGNOSIS — Z74.09 IMPAIRED MOBILITY: Primary | ICD-10-CM

## 2025-08-07 DIAGNOSIS — I50.20 HEART FAILURE WITH REDUCED EJECTION FRACTION (HFREF, <= 40%) (HCC): ICD-10-CM

## 2025-08-07 DIAGNOSIS — R26.2 AMBULATORY DYSFUNCTION: ICD-10-CM

## 2025-08-07 PROCEDURE — 97110 THERAPEUTIC EXERCISES: CPT | Performed by: PHYSICAL THERAPIST

## 2025-08-07 PROCEDURE — 97112 NEUROMUSCULAR REEDUCATION: CPT | Performed by: PHYSICAL THERAPIST

## 2025-08-12 ENCOUNTER — OFFICE VISIT (OUTPATIENT)
Facility: REHABILITATION | Age: 85
End: 2025-08-12
Attending: INTERNAL MEDICINE
Payer: MEDICARE

## 2025-08-14 ENCOUNTER — OFFICE VISIT (OUTPATIENT)
Facility: REHABILITATION | Age: 85
End: 2025-08-14
Attending: INTERNAL MEDICINE
Payer: MEDICARE

## 2025-08-15 ENCOUNTER — OFFICE VISIT (OUTPATIENT)
Facility: REHABILITATION | Age: 85
End: 2025-08-15
Attending: INTERNAL MEDICINE
Payer: MEDICARE

## 2025-08-18 ENCOUNTER — OFFICE VISIT (OUTPATIENT)
Facility: REHABILITATION | Age: 85
End: 2025-08-18
Attending: INTERNAL MEDICINE
Payer: MEDICARE

## 2025-08-18 DIAGNOSIS — R26.2 AMBULATORY DYSFUNCTION: ICD-10-CM

## 2025-08-18 DIAGNOSIS — N18.5 CHRONIC RENAL FAILURE, STAGE 5 (HCC): ICD-10-CM

## 2025-08-18 DIAGNOSIS — I50.20 HEART FAILURE WITH REDUCED EJECTION FRACTION (HFREF, <= 40%) (HCC): ICD-10-CM

## 2025-08-18 DIAGNOSIS — Z74.09 IMPAIRED MOBILITY: Primary | ICD-10-CM

## 2025-08-18 PROCEDURE — 97110 THERAPEUTIC EXERCISES: CPT

## 2025-08-18 PROCEDURE — 97112 NEUROMUSCULAR REEDUCATION: CPT

## 2025-08-21 ENCOUNTER — OFFICE VISIT (OUTPATIENT)
Facility: REHABILITATION | Age: 85
End: 2025-08-21
Attending: INTERNAL MEDICINE
Payer: MEDICARE

## 2025-08-21 DIAGNOSIS — N18.5 CHRONIC RENAL FAILURE, STAGE 5 (HCC): ICD-10-CM

## 2025-08-21 DIAGNOSIS — R26.2 AMBULATORY DYSFUNCTION: ICD-10-CM

## 2025-08-21 DIAGNOSIS — Z74.09 IMPAIRED MOBILITY: Primary | ICD-10-CM

## 2025-08-21 DIAGNOSIS — I50.20 HEART FAILURE WITH REDUCED EJECTION FRACTION (HFREF, <= 40%) (HCC): ICD-10-CM

## 2025-08-21 PROCEDURE — 97110 THERAPEUTIC EXERCISES: CPT | Performed by: PHYSICAL THERAPIST

## 2025-08-21 PROCEDURE — 97112 NEUROMUSCULAR REEDUCATION: CPT | Performed by: PHYSICAL THERAPIST

## 2025-08-22 ENCOUNTER — OFFICE VISIT (OUTPATIENT)
Facility: REHABILITATION | Age: 85
End: 2025-08-22
Attending: INTERNAL MEDICINE
Payer: MEDICARE

## 2025-08-22 DIAGNOSIS — R26.2 AMBULATORY DYSFUNCTION: ICD-10-CM

## 2025-08-22 DIAGNOSIS — N18.5 CHRONIC RENAL FAILURE, STAGE 5 (HCC): ICD-10-CM

## 2025-08-22 DIAGNOSIS — I50.20 HEART FAILURE WITH REDUCED EJECTION FRACTION (HFREF, <= 40%) (HCC): ICD-10-CM

## 2025-08-22 DIAGNOSIS — Z74.09 IMPAIRED MOBILITY: Primary | ICD-10-CM

## 2025-08-22 PROCEDURE — 97112 NEUROMUSCULAR REEDUCATION: CPT | Performed by: PHYSICAL THERAPIST

## 2025-08-22 PROCEDURE — 97110 THERAPEUTIC EXERCISES: CPT | Performed by: PHYSICAL THERAPIST
